# Patient Record
Sex: FEMALE | Race: WHITE | NOT HISPANIC OR LATINO | Employment: PART TIME | URBAN - METROPOLITAN AREA
[De-identification: names, ages, dates, MRNs, and addresses within clinical notes are randomized per-mention and may not be internally consistent; named-entity substitution may affect disease eponyms.]

---

## 2018-03-17 ENCOUNTER — HOSPITAL ENCOUNTER (EMERGENCY)
Facility: HOSPITAL | Age: 33
Discharge: HOME | End: 2018-03-17
Attending: FAMILY MEDICINE
Payer: MEDICAID

## 2018-03-17 VITALS
WEIGHT: 170 LBS | OXYGEN SATURATION: 100 % | RESPIRATION RATE: 18 BRPM | BODY MASS INDEX: 33.2 KG/M2 | HEART RATE: 73 BPM | SYSTOLIC BLOOD PRESSURE: 125 MMHG | DIASTOLIC BLOOD PRESSURE: 82 MMHG | TEMPERATURE: 100 F

## 2018-03-17 DIAGNOSIS — T14.90XA INJURY: ICD-10-CM

## 2018-03-17 DIAGNOSIS — T14.8XXA FRACTURE: Primary | ICD-10-CM

## 2018-03-17 PROCEDURE — 99283 EMERGENCY DEPT VISIT LOW MDM: CPT | Mod: 25

## 2018-03-17 PROCEDURE — 29125 APPL SHORT ARM SPLINT STATIC: CPT | Mod: LT

## 2018-03-17 PROCEDURE — 63600175 PHARM REV CODE 636 W HCPCS: Performed by: FAMILY MEDICINE

## 2018-03-17 PROCEDURE — 96372 THER/PROPH/DIAG INJ SC/IM: CPT | Mod: 59

## 2018-03-17 RX ORDER — KETOROLAC TROMETHAMINE 30 MG/ML
60 INJECTION, SOLUTION INTRAMUSCULAR; INTRAVENOUS
Status: COMPLETED | OUTPATIENT
Start: 2018-03-17 | End: 2018-03-17

## 2018-03-17 RX ORDER — HYDROCODONE BITARTRATE AND ACETAMINOPHEN 7.5; 325 MG/1; MG/1
1 TABLET ORAL EVERY 4 HOURS PRN
Qty: 18 TABLET | Refills: 0 | Status: SHIPPED | OUTPATIENT
Start: 2018-03-17 | End: 2018-11-21

## 2018-03-17 RX ORDER — NALOXONE HCL 0.4 MG/ML
VIAL (ML) INJECTION
Status: DISCONTINUED
Start: 2018-03-17 | End: 2018-03-17 | Stop reason: WASHOUT

## 2018-03-17 RX ADMIN — KETOROLAC TROMETHAMINE 60 MG: 30 INJECTION, SOLUTION INTRAMUSCULAR at 04:03

## 2018-03-17 NOTE — ED PROVIDER NOTES
Encounter Date: 3/17/2018       History     Chief Complaint   Patient presents with    Wrist Injury     left wrist pain after falling while roller skating     32-year-old female patient comes in with complaint of wrist injury while rollerskating patient drove herself here from the roller skating rink but otherwise has a makeshift splint on her arm patient has no other injuries did not hit her head GCS of 15.          Review of patient's allergies indicates:  No Known Allergies  History reviewed. No pertinent past medical history.  History reviewed. No pertinent surgical history.  History reviewed. No pertinent family history.  Social History   Substance Use Topics    Smoking status: Current Every Day Smoker     Packs/day: 0.50    Smokeless tobacco: Never Used    Alcohol use No     Review of Systems   Constitutional: Negative for fever.   HENT: Negative for sore throat.    Respiratory: Negative for shortness of breath.    Cardiovascular: Negative for chest pain.   Gastrointestinal: Negative for nausea.   Genitourinary: Negative for dysuria.   Musculoskeletal: Positive for joint swelling and myalgias. Negative for back pain.   Skin: Negative for rash.   Neurological: Negative for weakness.   Hematological: Does not bruise/bleed easily.   All other systems reviewed and are negative.      Physical Exam     Initial Vitals [03/17/18 1531]   BP Pulse Resp Temp SpO2   125/82 73 18 99.5 °F (37.5 °C) 100 %      MAP       96.33         Physical Exam    Nursing note and vitals reviewed.  Constitutional: She appears well-developed.   HENT:   Head: Normocephalic and atraumatic.   Right Ear: External ear normal.   Left Ear: External ear normal.   Nose: Nose normal.   Mouth/Throat: Oropharynx is clear and moist.   Eyes: Conjunctivae and EOM are normal. Pupils are equal, round, and reactive to light. Right eye exhibits no discharge. Left eye exhibits no discharge.   Neck: Normal range of motion. Neck supple. No tracheal  deviation present.   Cardiovascular: Normal rate, regular rhythm and normal heart sounds.   No murmur heard.  Pulmonary/Chest: Breath sounds normal. No respiratory distress. She has no wheezes.   Abdominal: Soft. Bowel sounds are normal.   Musculoskeletal: She exhibits tenderness.   Neurological: She is alert and oriented to person, place, and time.   Skin: Skin is warm and dry.         ED Course   Procedures  Labs Reviewed - No data to display       X-Rays:   Independently Interpreted Readings:   Other Readings:  Fracture of the left wrist    Medical Decision Making:   Initial Assessment:   Patient in moderate distress and no other complains    Differential Diagnosis:   Fracture  Edema  Sprain   strain                        Clinical Impression:   The primary encounter diagnosis was Fracture. A diagnosis of Injury was also pertinent to this visit.                           Kermit Quiñones MD  03/17/18 4950

## 2018-03-20 ENCOUNTER — OFFICE VISIT (OUTPATIENT)
Dept: FAMILY MEDICINE | Facility: CLINIC | Age: 33
End: 2018-03-20
Payer: MEDICAID

## 2018-03-20 VITALS
DIASTOLIC BLOOD PRESSURE: 69 MMHG | BODY MASS INDEX: 35.49 KG/M2 | HEART RATE: 100 BPM | SYSTOLIC BLOOD PRESSURE: 110 MMHG | WEIGHT: 180.75 LBS | TEMPERATURE: 98 F | OXYGEN SATURATION: 74 % | HEIGHT: 60 IN

## 2018-03-20 DIAGNOSIS — S52.132A DISPLACED FRACTURE OF NECK OF LEFT RADIUS, INITIAL ENCOUNTER FOR CLOSED FRACTURE: Primary | ICD-10-CM

## 2018-03-20 PROCEDURE — 99203 OFFICE O/P NEW LOW 30 MIN: CPT | Mod: S$PBB,,, | Performed by: FAMILY MEDICINE

## 2018-03-20 PROCEDURE — 99213 OFFICE O/P EST LOW 20 MIN: CPT | Mod: PBBFAC,PO | Performed by: FAMILY MEDICINE

## 2018-03-20 PROCEDURE — 99999 PR PBB SHADOW E&M-EST. PATIENT-LVL III: CPT | Mod: PBBFAC,,, | Performed by: FAMILY MEDICINE

## 2018-03-20 RX ORDER — OXYCODONE AND ACETAMINOPHEN 5; 325 MG/1; MG/1
1 TABLET ORAL EVERY 8 HOURS PRN
Qty: 20 TABLET | Refills: 0 | Status: SHIPPED | OUTPATIENT
Start: 2018-03-20 | End: 2018-11-21

## 2018-03-20 RX ORDER — PROMETHAZINE HYDROCHLORIDE 25 MG/1
25 TABLET ORAL EVERY 6 HOURS PRN
Qty: 30 TABLET | Refills: 0 | Status: SHIPPED | OUTPATIENT
Start: 2018-03-20 | End: 2018-11-21

## 2018-03-20 NOTE — PROGRESS NOTES
(Portions of this note were dictated using voice recognition software and may contain dictation related errors in spelling/grammar/syntax not found on text review)    CC:   Chief Complaint   Patient presents with    Wrist Injury       HPI: 32 y.o. female presents as a new patient today.  Was seen in ED 3 days ago where she was evaluated for a left wrist injury after falling while rollerskating. X-rays revealed a transverse distal radial metaphysis fracture with dorsal displacement and angulation.  She also had a nondisplaced ulnar styloid fracture.  She was splinted in the ED.  Does complain of pain at the left wrist.  Was given hydrocodone 7.5 but finds this is only slightly helps with her pain.  Has a lot more pain at night.  Has been also having some vomiting likely secondary to the pain medication but is overall trying to eat and drink fluids normally.  No other injury.                    History reviewed. No pertinent past medical history.    Past Surgical History:   Procedure Laterality Date     SECTION      CHOLECYSTECTOMY      EYE SURGERY      LITHOTRIPSY      TUBAL LIGATION         No family history on file.    Social History     Social History    Marital status:      Spouse name: N/A    Number of children: N/A    Years of education: N/A     Occupational History    Not on file.     Social History Main Topics    Smoking status: Current Every Day Smoker     Packs/day: 0.50    Smokeless tobacco: Never Used    Alcohol use No    Drug use: No    Sexual activity: Not on file     Other Topics Concern    Not on file     Social History Narrative    No narrative on file       ROS:  GENERAL: No fever, chills, fatigability or weight loss.  SKIN: No rashes, no itching.  HEAD: No headaches.  EYES: No visual changes  EARS: No ear pain or changes in hearing.  NOSE: No congestion or rhinorrhea.  MOUTH & THROAT: No hoarseness, change in voice, or sore throat.  NODES: Denies swollen  glands.  CHEST: Denies CONNER, cyanosis, wheezing, cough and sputum production.  CARDIOVASCULAR: Denies chest pain, PND, orthopnea.  ABDOMEN: No nausea, vomiting, or changes in bowel function.  URINARY: No flank pain, dysuria or hematuria.  PERIPHERAL VASCULAR: No claudication or cyanosis.  MUSCULOSKELETAL: Above.  NEUROLOGIC: Does get some burning sensation in her fingers and sometimes shooting pain up the arm on the left.    Vital signs reviewed  PE:   APPEARANCE: Well nourished, well developed, in no acute distress.    HEAD: Normocephalic, atraumatic.  EYES:    Conjunctivae noninjected.  CHEST: Good inspiratory effort. Lungs clear to auscultation with no wheezes or crackles.  CARDIOVASCULAR: Normal S1, S2. No rubs, murmurs, or gallops.  ABDOMEN: Bowel sounds normal. Not distended. Soft. No tenderness or masses. No organomegaly.  EXTREMITIES: Left arm in sling and splint, not manipulated on today's exam. Distal sensation intact.     IMPRESSION  1. Displaced fracture of neck of left radius, initial encounter for closed fracture            PLAN  Reviewed x-rays.  She does have a significantly dorsally displaced distal radial fracture with nondisplaced ulnar styloid fracture.  Given displacement and angulation of radial fracture, suspicious that she will need surgical management.  Referral to orthopedics for further evaluation and management.  Hydrocodone is not providing enough pain relief at this time.  Will switch to Percocet 5/325 as needed, Phenergan given in case of nausea symptoms

## 2018-03-22 DIAGNOSIS — S52.572A CLOSED DIE PUNCH FRACTURE OF DISTAL RADIUS, LEFT, INITIAL ENCOUNTER: Primary | ICD-10-CM

## 2018-03-22 DIAGNOSIS — S52.612A: ICD-10-CM

## 2018-04-03 ENCOUNTER — CLINICAL SUPPORT (OUTPATIENT)
Dept: REHABILITATION | Facility: HOSPITAL | Age: 33
End: 2018-04-03
Attending: SURGERY
Payer: MEDICAID

## 2018-04-03 DIAGNOSIS — M25.60 DECREASED RANGE OF MOTION: ICD-10-CM

## 2018-04-03 DIAGNOSIS — M25.532 LEFT WRIST PAIN: ICD-10-CM

## 2018-04-03 DIAGNOSIS — M25.432 SWELLING OF WRIST JOINT, LEFT: ICD-10-CM

## 2018-04-03 PROBLEM — M79.89 SWELLING OF LEFT HAND: Status: ACTIVE | Noted: 2018-04-03

## 2018-04-03 PROCEDURE — L3808 WHFO, RIGID W/O JOINTS: HCPCS | Mod: PO

## 2018-04-03 PROCEDURE — 97165 OT EVAL LOW COMPLEX 30 MIN: CPT | Mod: PO

## 2018-04-03 NOTE — PATIENT INSTRUCTIONS
[facility]  OCCUPATIONAL THERAPY  HOME EXERCISE PROGRAM     Complete the following exercises with 10 repetitions each, 4-6x/day.       Perform the above elbow exercises in 3 positions:  -Palm up, Palm down, and Thumb up      Perform the above exercise with your elbow bent at your side.    [facility]  OCCUPATIONAL THERAPY  HOME EXERCISE PROGRAM     Complete the following exercises with 10 repetitions each, 4-6x/day.                                                                                        Complete the following exercises with 10 repetitions each, 4-6x/day.                                                           Therapist: TRENTON Benz

## 2018-04-03 NOTE — PLAN OF CARE
Occupational Therapy -Hand / Wrist  Evaluation    Patient: Sarah Yee  Date of Evaluation: 4/3/2018  Referring Physician:  Dr. Cesar San  Diagnosis: s/p ORIF of L DR fx & ulna styloid fx     1. Left wrist pain     2. Swelling of left hand     3. Decreased range of motion       MRN: [id number]    Referral Orders:   Eval and treat; splint fabrication of wrist cock up    Start Time: 1:15  End Time: 2:10  Total Time: 55 min  IE x 30 min, Orthosis fabrication ( WHFO, static wrist cock up) x 25 min     Hand dominance: Right    Occupation:  Pt was working at [employer], but took leave just before her injury  Working presently:  no  Workmen's Compensation:  no    Date of onset: sx date 3/28/18; 6 days status post surgery  Involved area:  L wrist  Mechanism of Injury:   Fall at a skating rink  Past Medical History/Physical Systems Review:   No past medical history on file.  Past Surgical History:   Procedure Laterality Date     SECTION      CHOLECYSTECTOMY      EYE SURGERY      LITHOTRIPSY      TUBAL LIGATION           Living status: Lives with her 12 y/o son. Pt typically drives, but has not been since injury. PLOF independent.    Environmental Concerns/ Fall Risk:  None  Barriers to Learning: None   Cultural/Spiritual : None   Developmental/Education: None   Abuse/ Neglect: none   Nutritional Deficit: None   Language: None   Hearing/Vision Deficit: None   Other:     Subjective:  Pt reports this wrap and stuff they put me in has been really bothering me. I've been getting some numbness and tingling in my arm and hand.    Pain   At rest: 6 out of 10  With work/ Activity: 8 out of 10  Sleepin out of 10, occasionally  Location of Pain : L wrist    Patients goals for therapy are:  to get my wrist strong again    Objective:   Observation  : Pt wearing ortho volar cast/splint with dressings.  Swelling noted at wrist and distal forearm. Pt presents with staples in incision. Min bruising noted at  volar forearm    Sensation: light touch intact, c/o tingling in thumb, IF, & LF  Scar / Wound: healing, clean incision, with staples remaining in incision; min bruising noted   Edema:  Mild edema noted in hand, wrist and FA; minimal swelling noted in fingers  L  MP's: 18.8  PPC: 18.5  PWC: 17          Range of Motion:  ROM: Thumb   /   Index   /   Middle   /   Ring  /   Small   MP       0/25;         0/62;        -5/65 ;     -8/60;      0/60  PIP       0/15;         0/80;         0/75 ;      0/75;      0/70  DIP       -----;         0/45;         0/50 ;       0/40;      0/60  ASIF      40;            187;         185;         167;       190    Thumb opposition: to LF  Thumb ext: 40  Thumb abd: 45    Supination/Pronation: 20 / 35  Wrist AROM: deferred today                        Manual Muscle Test: deferred                                       Strength: (JOSS Dynamometer in lbs.), Average 3 trials, Position II: tba when appropriate        Pinch Strength: (Pinch Gauge in psis), Average 3 trials: tba when appropriate          Functional Limitations:   Self Care / ADL: Limited use of L hand for ADLs, grooming, hygiene  Work/Activities: Limited use of L hand for driving, cleaning, cooking, picking up items  Leisure: Limited use of L hand for leisure activities    Focus on Therapeutic Outcomes (FOTO) Symptom Scale: Patient score indicates self perception of 58% impairment, limitation or restriction of function upon initial assessment.       Treatment Included:   OT evaluation and instruction in written HEP including modalities prn for pain and inflammation management, wound care, tendon glides, finger abd/add, thumb opposition, thumb ext/abd, thumb flex, joint blocking to thumb.   Fabricated a custom static volar wrist cock up orthosis (L 3808 FO) to L volar hand wrist and forearm to maintain immobilization of L wrist.  Instructed to wear orthosis 24/7 with removal for hygiene/wound care.  Instructed to monitor  "for pain/pressure, increased edema, or redness and to contact office for adjustments as needed. Patient reported good understanding of orthotic wear and care schedule.      Patient demonstrates good understanding of HEP/modality use for pain management.      Assessment:   Pt is a 33 y/o female with dx of s/p ORIF of L DR fx and ulnar styloid fx. Pt presents with the below mentioned problems. Satisfactory fit noted of orthosis, but adjustments may be required in the future due to change in swelling.  Nearly full fist noted after performing HEP today. Pt would cont to benefit from skilled OT services to improve deficits per protocol, and maximize functional use of L UE.     Rehab potential:   Excellent    Problem List :   Decreased ROM   Decreased  and pinch strength   Decreased muscle strength   Decreased functional hand use   Increased pain   Edema         Profile and History Assessment of Occupational Performance Level of Clinical Decision Making Complexity Score   Occupational Profile:   Sarah Yee is a 32 y.o. female who lives with their son and is currently employed at [employer], but was on leave. Sarah Yee has difficulty with  bathing, grooming and dressing  driving/transportation management and housework/household chores  affecting his/her daily functional abilities. His/her main goal for therapy is " to get my wrist strong again".     Comorbidities:   none    Medical and Therapy History Review:   Brief, no prior therapy    LOW     Performance Deficits    Physical:  Joint Mobility  Joint Stability  Muscle Power/Strength  Skin Integrity/Scar Formation  Edema   Strength  Fine Motor Coordination  Pain    Cognitive:  No Deficits    Psychosocial:    Habits  Routines    MODERATE Clinical Decision Making:  low    Assessment Process:  Comprehensive Assessment  **fabriccation of orthosis today    Modification/Need for Assistance:  Not Necessary    Intervention Selection:  Multiple Treatment Options    LOW   " low  Based on PMHX, co morbidities , data from assessments and functional level of assistance required with task and clinical presentation directly impacting function.       STGs (4 weeks):  1)  Initiate HEP, modalities, and fabricate orthosis  2) Increase ROM of fingers, wrist, and FA 5-10 degrees to increase functional hand use for ADLs/work/leisure activities  3)   Decrease edema .2-.3 mm to increase joint mobility /flexibility for functional hand use.  4)  Pt will report pain no higher than 4/10 at the worst during ADLs/IADLs    Goals: ( 8 weeks)   1)  Patient to be IND with HEP and modalities for pain management  2)  Pt will demonstrate full L composite fist in order to  items needed for daily activities  3)  Pt will demonstrate L wrist and FA AROM WFL in order to perform ADLs/IADLs  4)   and pinch to be assessed when appropriate, and goals set accordingly  5)  Pt will achieve FOTO score no higher than 35% impairment with L UE function    Plan:   Patient to be treated by Occupational Therapy  2-3  times per week for   8                   weeks  during the certification period from   4/3/2018     to  6/3/18 to achieve the established goals.  Treatment to include :  paraffin, fluidotherapy, manual therapy/joint mobilizations, modalities for pain management, US 3mhz, therapeutic exercises/activities, strengthening, scar and edema management, orthosis fabrication, as well as any other treatments deemed necessary based on the patient's needs or progress.  Progress per protocol and MD orders.          Therapist: TRENTON Benz  Date: 4/3/18      I certify the need for these services furnished under this plan of treatment and while under my care    ____________________________________                         __________________  Physician/Referring Practitioner                                               Date of Signature

## 2018-04-05 ENCOUNTER — CLINICAL SUPPORT (OUTPATIENT)
Dept: REHABILITATION | Facility: HOSPITAL | Age: 33
End: 2018-04-05
Attending: SURGERY
Payer: MEDICAID

## 2018-04-05 DIAGNOSIS — M79.89 SWELLING OF LEFT HAND: ICD-10-CM

## 2018-04-05 DIAGNOSIS — M25.432 SWELLING OF WRIST JOINT, LEFT: ICD-10-CM

## 2018-04-05 DIAGNOSIS — M25.532 LEFT WRIST PAIN: ICD-10-CM

## 2018-04-05 PROCEDURE — 97530 THERAPEUTIC ACTIVITIES: CPT | Mod: PO

## 2018-04-05 NOTE — PROGRESS NOTES
"DAILY TREATMENT NOTE    DATE: 4/5/2018    Start Time:  2:00  Stop Time:  2:35  Visit: 2/25, expires 6/8/18    PROCEDURES:    TIMED  Procedure Min.   TA 35                     UNTIMED  Procedure Min.             Total Timed Minutes:  35  Total Timed Units:  2  Total Untimed Units:  0  Charges Billed/# of units:  2TA      Progress/Current Status    Subjective:     Patient ID: Sarah Yee is a 32 y.o. female.  Diagnosis:   1. Left wrist pain     2. Swelling of left hand     3. Swelling of wrist joint, left       Pain: 6-7 /10  Pt states "I went and got my staples out this morning. It feels much better. And the doctor said to hold off on turning my arm up and down (re: sup/pro), and wants me to start moving my wrist back." Pt reports compliance with HEP and orthosis wear. Pt requested small adjustment be made to orthosis for improved fit near fingers.    Objective:     **Pt is 1 week and 1 day post op; staples removed today at MD follow up, steristrips present  Clean healing incision noted. Pt wearing orthosis to clinic.     Pt seen by OT this session. Treatment consisted of the following:     Date: 4/5/18    Visit: 2/25; expires 6/8/18   PROM to all digits as tolerated 10 reps each   Wrist gentle AROM (ext/flex/RD/UD), as tolerated 10 reps each   Tendon glides (intrinsic +/-, flat fist, composite fist) 10 reps   Thumb opposition 10 reps, able to oppose to SF   Thumb circumduction 10 reps   Thumb flex/ext 10 reps   Finger abd/add 10 reps   Digit ext off table 10 reps each         Adjustments made to orthosis for better fit and movement of MPs.    Assessment:     Pt tolerated treatment well this date. Pt reported moderate pain coming in, but denied increase in pain. Good tolerance of activity. Staples removed earlier today, and steristrips present. Good fit noted of orthosis after adjustments made. Full loose composite fist noted today, and improved opposition noted as well. Pt cont to present with pain, stiffness, " decreased ROM, weakness, and limited functionally with L hand. Some limitations due to protocol, cont to be NWB. Good compliance with HEP and orthosis wear. Pt would cont to benefit from skilled OT services to maximize functional use of L hand.     Patient Education/Response:     Cont HEP, modalities, wound care, hand hygiene, and orthosis wear. Pt verbalized understanding of education and review of HEP.     Plans and Goals:     Cont OT poc 2-3x/week for 8 weeks during certification period 4/3/18 to 6/3/18 in pursuit of established goals.    STGs (4 weeks):  1)  Initiate HEP, modalities, and fabricate orthosis  2) Increase ROM of fingers, wrist, and FA 5-10 degrees to increase functional hand use for ADLs/work/leisure activities  3)   Decrease edema .2-.3 mm to increase joint mobility /flexibility for functional hand use.  4)  Pt will report pain no higher than 4/10 at the worst during ADLs/IADLs     Goals: ( 8 weeks)   1)  Patient to be IND with HEP and modalities for pain management  2)  Pt will demonstrate full L composite fist in order to  items needed for daily activities  3)  Pt will demonstrate L wrist and FA AROM WFL in order to perform ADLs/IADLs  4)   and pinch to be assessed when appropriate, and goals set accordingly  5)  Pt will achieve FOTO score no higher than 35% impairment with L UE function    TRENTON Benz

## 2018-04-06 DIAGNOSIS — S52.501A CLOSED FRACTURE OF RIGHT DISTAL RADIUS: Primary | ICD-10-CM

## 2018-04-10 ENCOUNTER — CLINICAL SUPPORT (OUTPATIENT)
Dept: REHABILITATION | Facility: HOSPITAL | Age: 33
End: 2018-04-10
Attending: SURGERY
Payer: MEDICAID

## 2018-04-10 DIAGNOSIS — M25.532 LEFT WRIST PAIN: ICD-10-CM

## 2018-04-10 DIAGNOSIS — M25.432 SWELLING OF WRIST JOINT, LEFT: ICD-10-CM

## 2018-04-10 DIAGNOSIS — M79.89 SWELLING OF LEFT HAND: ICD-10-CM

## 2018-04-10 PROCEDURE — 97530 THERAPEUTIC ACTIVITIES: CPT | Mod: PO

## 2018-04-10 NOTE — PROGRESS NOTES
"DAILY TREATMENT NOTE    DATE: 4/10/2018    Start Time:  3:00  Stop Time:  3:50  Visit: 3/25, expires 6/8/18    PROCEDURES:    TIMED  Procedure Min.   TA 40                     UNTIMED  Procedure Min.   Paraffin 10         Total Timed Minutes:  40  Total Timed Units:  3  Total Untimed Units:  0  Charges Billed/# of units:  3TA      Progress/Current Status    Subjective:     Patient ID: Sarah Yee is a 33 y.o. female.  Diagnosis:   1. Left wrist pain     2. Swelling of left hand     3. Swelling of wrist joint, left       Pain: 6 /10  Pt states "I'm constantly moving my fingers. I can tell I'm getting better." Pt reports compliance with HEP and orthosis wear.     Objective:     **Pt is 1 week and 6 day post op; closed incision with 4 steristrips still present. Steristrips easily removed. Min scabbing still noted.    Pt wearing orthosis to clinic. Small macerated area noted at hypothenar. Reviewed keeping hand dry after washing, hand hygiene, and checking and monitoring hand and wrist daily. Pt verbalized understanding of education.     Pt seen by OT this session. Treatment consisted of the following:    Patient received paraffin bath to L hand(s) for 10 minutes to increase blood flow, circulation, pain management and for tissue elasticity prior to therex.   Performed gentle scar massage to volar wrist/forearm incision area for 5 minutes to decrease adhesions and improve tensile glide.        Date: 4/10/18    Visit: 3/25; expires 6/8/18   PROM to all digits as tolerated 10 reps each   Wrist gentle AROM (ext/flex/RD/UD), as tolerated 10 reps each   Tendon glides (intrinsic +/-, flat fist, composite fist) 10 reps   Thumb opposition 10 reps, able to oppose to SF   Thumb circumduction 10 reps CW/CCW   Thumb flex/ext 10 reps   Finger abd/add 10 reps   Digit ext off table 10 reps each   Wrist ext off of table against gravity 10 reps     Pt declined cold pack after tx.     Assessment:     Pt tolerated treatment well this " date. Pt reported moderate pain coming in again, but denied increase in pain. Good tolerance of activity.  Full composite fist noted today, and improved opposition noted as well. Pt reported decreased tightness in wrist and digits. Increased wrist AROM noted this date. Pt cont to present with pain, stiffness, decreased ROM, weakness, and limited functionally with L hand. Some limitations due to protocol, cont to be NWB. Good compliance with HEP and orthosis wear. Pt would cont to benefit from skilled OT services to maximize functional use of L hand.     Patient Education/Response:     Cont HEP, modalities, scar management,  hand hygiene, and orthosis wear. Pt verbalized understanding of education and review of HEP.     Plans and Goals:     Cont OT poc 2-3x/week for 8 weeks during certification period 4/3/18 to 6/3/18 in pursuit of established goals.    STGs (4 weeks):  1)  Initiate HEP, modalities, and fabricate orthosis  2) Increase ROM of fingers, wrist, and FA 5-10 degrees to increase functional hand use for ADLs/work/leisure activities  3)   Decrease edema .2-.3 mm to increase joint mobility /flexibility for functional hand use.  4)  Pt will report pain no higher than 4/10 at the worst during ADLs/IADLs     Goals: ( 8 weeks)   1)  Patient to be IND with HEP and modalities for pain management  2)  Pt will demonstrate full L composite fist in order to  items needed for daily activities  3)  Pt will demonstrate L wrist and FA AROM WFL in order to perform ADLs/IADLs  4)   and pinch to be assessed when appropriate, and goals set accordingly  5)  Pt will achieve FOTO score no higher than 35% impairment with L UE function    TRENTON Benz

## 2018-04-12 ENCOUNTER — CLINICAL SUPPORT (OUTPATIENT)
Dept: REHABILITATION | Facility: HOSPITAL | Age: 33
End: 2018-04-12
Attending: SURGERY
Payer: MEDICAID

## 2018-04-12 DIAGNOSIS — M25.532 LEFT WRIST PAIN: ICD-10-CM

## 2018-04-12 DIAGNOSIS — M79.89 SWELLING OF LEFT HAND: ICD-10-CM

## 2018-04-12 DIAGNOSIS — M25.432 SWELLING OF WRIST JOINT, LEFT: ICD-10-CM

## 2018-04-12 PROCEDURE — 97530 THERAPEUTIC ACTIVITIES: CPT | Mod: PO

## 2018-04-12 NOTE — PROGRESS NOTES
"DAILY TREATMENT NOTE    DATE: 4/12/2018    Start Time:  1:00  Stop Time:  1:45  Visit: 4/25, expires 6/8/18    PROCEDURES:    TIMED  Procedure Min.   TA 40                     UNTIMED  Procedure Min.   Paraffin 5         Total Timed Minutes:  40  Total Timed Units:  3  Total Untimed Units:  0  Charges Billed/# of units:  3TA      Progress/Current Status    Subjective:     Patient ID: Sarah Yee is a 33 y.o. female.  Diagnosis:   1. Left wrist pain     2. Swelling of left hand     3. Swelling of wrist joint, left       Pain: 3-4 /10  Pt states "I'm having a little pain today, not too bad. It might be a little more sore because I was using it to help me shower." Pt reports compliance with HEP and orthosis wear.     Objective:     **Pt is 2 week and 0 day post op;  Min scabbing still noted. Closed incision.   Pt wearing orthosis to clinic.     Pt seen by OT this session. Treatment consisted of the following:    Patient received paraffin bath to L hand(s) for 5 minutes to increase blood flow, circulation, pain management and for tissue elasticity prior to therex.   Performed gentle scar massage to volar wrist/forearm incision area for 5 minutes to decrease adhesions and improve tensile glide.        Date: 4/12/18    Visit: 4/25; expires 6/8/18   PROM to all digits as tolerated 10 reps each   Wrist gentle AROM (ext/flex/RD/UD), as tolerated 10 reps each   Tendon glides (intrinsic +/-, flat fist, composite fist) 10 reps   Pinky slides 10 reps   Thumb circumduction 10 reps CW/CCW   Thumb flex/ext 10 reps   Finger abd/add 10 reps   Digit ext off table 10 reps each   Wrist ext off of table against gravity 10 reps   Gentle supination/pronation AROM, pain free 10 reps     Pt declined cold pack after tx.     Assessment:     Pt tolerated treatment well this date. Pt reported moderate pain coming in again, but denied increase in pain during treatment. Good tolerance of activity.  Full composite fist noted today, and improved " opposition noted as well. Pt reported decreased tightness in wrist and digits. Increased wrist AROM noted again this date. Pt cont to present with pain, stiffness, decreased ROM, weakness, and limited functionally with L hand. Some limitations due to protocol, cont to be NWB. Good compliance with HEP and orthosis wear. Pt would cont to benefit from skilled OT services to maximize functional use of L hand.     Patient Education/Response:     Cont HEP, modalities, scar management,  hand hygiene, and orthosis wear. Pt verbalized understanding of education and review of HEP.     Plans and Goals:     Cont OT poc 2-3x/week for 8 weeks during certification period 4/3/18 to 6/3/18 in pursuit of established goals.    STGs (4 weeks):  1)  Initiate HEP, modalities, and fabricate orthosis  2) Increase ROM of fingers, wrist, and FA 5-10 degrees to increase functional hand use for ADLs/work/leisure activities  3)   Decrease edema .2-.3 mm to increase joint mobility /flexibility for functional hand use.  4)  Pt will report pain no higher than 4/10 at the worst during ADLs/IADLs     Goals: ( 8 weeks)   1)  Patient to be IND with HEP and modalities for pain management  2)  Pt will demonstrate full L composite fist in order to  items needed for daily activities  3)  Pt will demonstrate L wrist and FA AROM WFL in order to perform ADLs/IADLs  4)   and pinch to be assessed when appropriate, and goals set accordingly  5)  Pt will achieve FOTO score no higher than 35% impairment with L UE function    TRENTON Benz

## 2018-04-16 ENCOUNTER — CLINICAL SUPPORT (OUTPATIENT)
Dept: REHABILITATION | Facility: HOSPITAL | Age: 33
End: 2018-04-16
Attending: SURGERY
Payer: MEDICAID

## 2018-04-16 DIAGNOSIS — M79.89 SWELLING OF LEFT HAND: ICD-10-CM

## 2018-04-16 DIAGNOSIS — M25.532 LEFT WRIST PAIN: ICD-10-CM

## 2018-04-16 DIAGNOSIS — M25.432 SWELLING OF WRIST JOINT, LEFT: ICD-10-CM

## 2018-04-16 PROCEDURE — 97530 THERAPEUTIC ACTIVITIES: CPT | Mod: PO

## 2018-04-16 NOTE — PROGRESS NOTES
"DAILY TREATMENT NOTE    DATE: 4/16/2018    Start Time:  10:50  Stop Time:  11:25  Visit: 5/25, expires 6/8/18    PROCEDURES:    TIMED  Procedure Min.   TA 30                     UNTIMED  Procedure Min.   Paraffin 5         Total Timed Minutes:  30  Total Timed Units:  2  Total Untimed Units:  0  Charges Billed/# of units:  2TA      Progress/Current Status    Subjective:     Patient ID: Sarah Yee is a 33 y.o. female.  Diagnosis:   1. Left wrist pain     2. Swelling of left hand     3. Swelling of wrist joint, left       Pain: 3-4 /10  Pt states "The pain isn't too bad the only pain I'm really getting is in my palm near the thumb. That pain still hasn't gone away. I am starting to get more feeling in some of my fingers now." Pt reports compliance with HEP and orthosis wear.     Objective:     **Pt is 2 week and 5 days post op;  Min scabbing still noted. Closed incision.   Pt wearing orthosis to clinic.     Pt seen by OT this session. Treatment consisted of the following:    Patient received paraffin bath to L hand(s) for 5 minutes to increase blood flow, circulation, pain management and for tissue elasticity prior to therex.   Performed gentle scar massage to volar wrist/forearm incision area for 5 minutes to decrease adhesions and improve tensile glide.        Date: 4/16/18    Visit: 5/25; expires 6/8/18   PROM to all digits as tolerated 10 reps each   Wrist  AA/AROM (ext/flex/RD/UD), as tolerated 10 reps each   Tendon glides (intrinsic +/-, flat fist, composite fist) 10 reps   Pinky slides 10 reps   Thumb circumduction 10 reps CW/CCW   Thumb flex/ext 10 reps   Finger abd/add 10 reps   Digit ext off table 10 reps each   Wrist ext off of table against gravity 10 reps   Gentle supination/pronation AROM, pain free 10 reps   Place and hold for wrist and finger ext 10 reps, with 5 sec hold each     Pt declined cold pack after tx.     Assessment:     Pt tolerated treatment well this date. Pt reported moderate pain coming " in again, but denied increase in pain during treatment. Good tolerance of activity.  Pt reported decreased tightness in wrist and digits. Increased wrist AROM noted again this date. Pt cont to present with pain, stiffness, decreased ROM, weakness, and limited functionally with L hand. Some limitations due to protocol, cont to be NWB. Good compliance with HEP and orthosis wear. Pt would cont to benefit from skilled OT services to maximize functional use of L hand.     Patient Education/Response:     Cont HEP, modalities, scar management,  hand hygiene, and orthosis wear. Pt verbalized understanding of education and review of HEP.     Plans and Goals:     Cont OT poc 2-3x/week for 8 weeks during certification period 4/3/18 to 6/3/18 in pursuit of established goals.    STGs (4 weeks):  1)  Initiate HEP, modalities, and fabricate orthosis  2) Increase ROM of fingers, wrist, and FA 5-10 degrees to increase functional hand use for ADLs/work/leisure activities  3)   Decrease edema .2-.3 mm to increase joint mobility /flexibility for functional hand use.  4)  Pt will report pain no higher than 4/10 at the worst during ADLs/IADLs     Goals: ( 8 weeks)   1)  Patient to be IND with HEP and modalities for pain management  2)  Pt will demonstrate full L composite fist in order to  items needed for daily activities  3)  Pt will demonstrate L wrist and FA AROM WFL in order to perform ADLs/IADLs  4)   and pinch to be assessed when appropriate, and goals set accordingly  5)  Pt will achieve FOTO score no higher than 35% impairment with L UE function    TRENTON Benz

## 2018-04-18 ENCOUNTER — CLINICAL SUPPORT (OUTPATIENT)
Dept: REHABILITATION | Facility: HOSPITAL | Age: 33
End: 2018-04-18
Attending: SURGERY
Payer: MEDICAID

## 2018-04-18 DIAGNOSIS — M25.532 LEFT WRIST PAIN: ICD-10-CM

## 2018-04-18 DIAGNOSIS — M79.89 SWELLING OF LEFT HAND: ICD-10-CM

## 2018-04-18 DIAGNOSIS — M25.432 SWELLING OF WRIST JOINT, LEFT: ICD-10-CM

## 2018-04-18 PROCEDURE — 97530 THERAPEUTIC ACTIVITIES: CPT | Mod: PO

## 2018-04-18 NOTE — PROGRESS NOTES
"DAILY TREATMENT NOTE    DATE: 4/18/2018    Start Time:  11:00  Stop Time:  11:40  Visit:6/25, expires 6/8/18    PROCEDURES:    TIMED  Procedure Min.   TA 35                     UNTIMED  Procedure Min.   Paraffin 5         Total Timed Minutes:  35  Total Timed Units:  2  Total Untimed Units:  0  Charges Billed/# of units:  2TA      Progress/Current Status    Subjective:     Patient ID: Sarah Yee is a 33 y.o. female.  Diagnosis:   1. Left wrist pain     2. Swelling of left hand     3. Swelling of wrist joint, left       Pain: 0 /10  Pt states "It's feeling better." Pt reports compliance with HEP and orthosis wear.     Objective:     **Pt is 3 week and 0 days post op;  Min scabbing still noted. Closed incision.   Pt wearing orthosis to clinic.     Pt seen by OT this session. Treatment consisted of the following:    Patient received paraffin bath to L hand(s) for 5 minutes to increase blood flow, circulation, pain management and for tissue elasticity prior to therex.   Performed gentle scar massage to volar wrist/forearm incision area for 5 minutes to decrease adhesions and improve tensile glide.        Date: 4/18/18    Visit: 6/25; expires 6/8/18   PROM to all digits as tolerated 10 reps each   Wrist  AA/AROM (ext/flex/RD/UD), as tolerated 10 reps each   Tendon glides (intrinsic +/-, flat fist, composite fist) 10 reps   Pinky slides 10 reps   Thumb circumduction 10 reps CW/CCW   Thumb flex/ext 10 reps   Finger abd/add 10 reps   Digit ext off table 10 reps each   Wrist ext off of table against gravity 10 reps   Gentle supination/pronation AROM, pain free 10 reps   Place and hold for wrist and finger ext 10 reps, with 5 sec hold each     Pt declined cold pack after tx.     Assessment:     Pt tolerated treatment well this date. Pt reported minimal pain coming in to therapy. Good tolerance of activity.  C/o wrist stiffness.   Pt cont to present with pain, stiffness, decreased ROM, weakness, and limited functionally with " L hand. Some limitations due to protocol, cont to be NWB. Good compliance with HEP and orthosis wear. Pt would cont to benefit from skilled OT services to maximize functional use of L hand.     Patient Education/Response:     Cont HEP, modalities, scar management,  hand hygiene, and orthosis wear. Pt verbalized understanding of education and review of HEP.     Plans and Goals:     Cont OT poc 2-3x/week for 8 weeks during certification period 4/3/18 to 6/3/18 in pursuit of established goals. Take measurements next visit.    STGs (4 weeks):  1)  Initiate HEP, modalities, and fabricate orthosis  2) Increase ROM of fingers, wrist, and FA 5-10 degrees to increase functional hand use for ADLs/work/leisure activities  3)   Decrease edema .2-.3 mm to increase joint mobility /flexibility for functional hand use.  4)  Pt will report pain no higher than 4/10 at the worst during ADLs/IADLs     Goals: ( 8 weeks)   1)  Patient to be IND with HEP and modalities for pain management  2)  Pt will demonstrate full L composite fist in order to  items needed for daily activities  3)  Pt will demonstrate L wrist and FA AROM WFL in order to perform ADLs/IADLs  4)   and pinch to be assessed when appropriate, and goals set accordingly  5)  Pt will achieve FOTO score no higher than 35% impairment with L UE function    TRENTON Benz

## 2018-04-24 ENCOUNTER — CLINICAL SUPPORT (OUTPATIENT)
Dept: REHABILITATION | Facility: HOSPITAL | Age: 33
End: 2018-04-24
Attending: SURGERY
Payer: MEDICAID

## 2018-04-24 DIAGNOSIS — M25.532 LEFT WRIST PAIN: ICD-10-CM

## 2018-04-24 DIAGNOSIS — M79.89 SWELLING OF LEFT HAND: ICD-10-CM

## 2018-04-24 DIAGNOSIS — M25.432 SWELLING OF WRIST JOINT, LEFT: ICD-10-CM

## 2018-04-24 PROCEDURE — 97530 THERAPEUTIC ACTIVITIES: CPT | Mod: PO

## 2018-04-24 NOTE — PROGRESS NOTES
"DAILY TREATMENT NOTE    DATE: 4/24/2018    Start Time:  1:00  Stop Time:  1:40  Visit:7/25, expires 6/8/18    PROCEDURES:    TIMED  Procedure Min.   TA 35                     UNTIMED  Procedure Min.   Paraffin 5         Total Timed Minutes:  35  Total Timed Units:  2  Total Untimed Units:  0  Charges Billed/# of units:  2TA      Progress/Current Status    Subjective:     Patient ID: Sarah Yee is a 33 y.o. female.  Diagnosis:   1. Left wrist pain     2. Swelling of left hand     3. Swelling of wrist joint, left       Pain: 0 /10  Pt states "It's just a little stiff." Pt reports compliance with HEP and orthosis wear.  Next MD follow up on 4/26/18.    Objective:     **Pt is 3 week and 6 days post op; Closed healing scar. Mild dermatitis noted on volar forearm   Pt wearing orthosis to clinic.     Pt seen by OT this session. Treatment consisted of the following:    Patient received paraffin bath to L hand(s) for 5 minutes to increase blood flow, circulation, pain management and for tissue elasticity prior to therex.   Performed gentle scar massage to volar wrist/forearm incision area for 5 minutes to decrease adhesions and improve tensile glide.        Date: 4/24/18    Visit: 7/25; expires 6/8/18   PROM to all digits as tolerated 10 reps each   Wrist  AA/AROM (ext/flex/RD/UD), as tolerated 10 reps each   Tendon glides (intrinsic +/-, flat fist, composite fist) 10 reps   Pinky slides 10 reps   Thumb circumduction 10 reps CW/CCW   Thumb flex/ext 10 reps   Finger abd/add 10 reps   Digit ext off table 10 reps each   Wrist ext off of table against gravity 10 reps   Gentle supination/pronation AROM, pain free 10 reps   Place and hold for wrist and finger ext 10 reps, with 5 sec hold each        Measurements taken this date 4/24/18:  Edema:  Mild edema noted in hand, wrist and FA; minimal swelling noted in fingers  L  MP's: 18.3 (-0.5)  PPC: 18.6 (+0.1)  PWC: 16.7 (-0.3)         Range of Motion:  Composite fist WFL  Digits " WFL     Thumb opposition: WFL but tight  Thumb ext: WFL  Thumb abd: WNL  Thumb MP: 33  Thumb IP: 43     Supination/Pronation: 24 (+4) / 55 (+20)  Wrist flex/ext: 42 / 50  RD/UD: 6 / 30    Assessment:     Pt tolerated treatment well this date. Pt denied pain. Cont c/o stiffness, but ROM improving.  Good tolerance of activity. Pt cont to present with pain, stiffness, decreased ROM, weakness, and limited functionally with L hand. Some limitations due to protocol, cont to be NWB. Good compliance with HEP, therapy attendance, and orthosis wear. PT making good progress towards goals. Pt would cont to benefit from skilled OT services to maximize functional use of L hand.     Patient Education/Response:     Cont HEP, modalities, scar management,  hand hygiene, and orthosis wear. Pt verbalized understanding of education and review of HEP.     Plans and Goals:     Cont OT poc 2-3x/week for 8 weeks during certification period 4/3/18 to 6/3/18 in pursuit of established goals. Progress as tolerated and per MD orders.     STGs (4 weeks):  1)  Initiate HEP, modalities, and fabricate orthosis  2) Increase ROM of fingers, wrist, and FA 5-10 degrees to increase functional hand use for ADLs/work/leisure activities  3)   Decrease edema .2-.3 mm to increase joint mobility /flexibility for functional hand use.  4)  Pt will report pain no higher than 4/10 at the worst during ADLs/IADLs     Goals: ( 8 weeks)   1)  Patient to be IND with HEP and modalities for pain management  2)  Pt will demonstrate full L composite fist in order to  items needed for daily activities  3)  Pt will demonstrate L wrist and FA AROM WFL in order to perform ADLs/IADLs  4)   and pinch to be assessed when appropriate, and goals set accordingly  5)  Pt will achieve FOTO score no higher than 35% impairment with L UE function    TRENTON Benz

## 2018-04-26 ENCOUNTER — CLINICAL SUPPORT (OUTPATIENT)
Dept: REHABILITATION | Facility: HOSPITAL | Age: 33
End: 2018-04-26
Attending: SURGERY
Payer: MEDICAID

## 2018-04-26 DIAGNOSIS — M79.89 SWELLING OF LEFT HAND: ICD-10-CM

## 2018-04-26 DIAGNOSIS — M25.432 SWELLING OF WRIST JOINT, LEFT: ICD-10-CM

## 2018-04-26 DIAGNOSIS — M25.532 LEFT WRIST PAIN: ICD-10-CM

## 2018-04-26 PROCEDURE — 97530 THERAPEUTIC ACTIVITIES: CPT | Mod: PO

## 2018-04-26 NOTE — PROGRESS NOTES
"OT DAILY TREATMENT NOTE    DATE: 4/26/2018    Start Time:  9:00  Stop Time:  9:45  Visit:8/25, expires 6/8/18    PROCEDURES:    TIMED  Procedure Min.   TA 40                     UNTIMED  Procedure Min.   Paraffin 5         Total Timed Minutes:  40  Total Timed Units:  2  Total Untimed Units:  0  Charges Billed/# of units:  3TA      Progress/Current Status    Subjective:     Patient ID: Sarah Yee is a 33 y.o. female.  Diagnosis:   1. Left wrist pain     2. Swelling of left hand     3. Swelling of wrist joint, left       Pain: 0 /10  Pt states "It's feeling fine. I play video games sometimes and that helps loosen my hand up." Pt reports compliance with HEP and orthosis wear.  Next MD follow up today 4/26/18.    Objective:     **Pt is 4 week and 1 days post op; Closed scar. Mild dermatitis noted on volar forearm   Pt wearing orthosis to clinic.     Pt seen by OT this session. Treatment consisted of the following:    Patient received paraffin bath to L hand(s) for 5 minutes to increase blood flow, circulation, pain management and for tissue elasticity prior to therex.   Performed gentle scar massage to volar wrist/forearm incision area for 5 minutes to decrease adhesions and improve tensile glide.        Date: 4/26/18    Visit: 8/25; expires 6/8/18   PROM to all digits as tolerated 10 reps each   Wrist  AA/AROM (ext/flex/RD/UD), as tolerated 10 reps each   Tendon glides (intrinsic +/-, flat fist, composite fist) 10 reps   Pinky slides 10 reps   Thumb circumduction 10 reps CW/CCW   Thumb flex/ext 10 reps   Finger abd/add 10 reps   Digit ext off table 10 reps each   Wrist ext while holding unweighted roll, to isolate wrist extensors 10 reps   Gentle supination/pronation AROM, pain free 10 reps   Place and hold for wrist and finger ext 10 reps, with 5 sec hold each   Wrist dexerciser 4 reps        Measurements taken 4/24/18:  Edema:  Mild edema noted in hand, wrist and FA; minimal swelling noted in fingers  L  MP's: 18.3 " (-0.5)  PPC: 18.6 (+0.1)  PWC: 16.7 (-0.3)         Range of Motion:  Composite fist WFL  Digits WFL     Thumb opposition: WFL but tight  Thumb ext: WFL  Thumb abd: WNL  Thumb MP: 33  Thumb IP: 43     Supination/Pronation: 24 (+4) / 55 (+20)  Wrist flex/ext: 42 / 50  RD/UD: 6 / 30    Assessment:     Pt tolerated treatment well this date. Pt denied pain. Cont c/o stiffness, but ROM improving.  Good tolerance of activity. Pt cont to present with occasional pain, stiffness, decreased ROM, weakness, and limited functionally with L hand. Some limitations due to protocol, cont to be NWB. Good compliance with HEP, therapy attendance, and orthosis wear. Pt making good progress towards goals. Pt would cont to benefit from skilled OT services to maximize functional use of L hand.     Patient Education/Response:     Cont HEP, modalities, scar management,  hand hygiene, and orthosis wear. Pt verbalized understanding of education and review of HEP.     Plans and Goals:     Cont OT poc 2-3x/week for 8 weeks during certification period 4/3/18 to 6/3/18 in pursuit of established goals. Progress as tolerated and per MD orders.     STGs (4 weeks):  1)  Initiate HEP, modalities, and fabricate orthosis  2) Increase ROM of fingers, wrist, and FA 5-10 degrees to increase functional hand use for ADLs/work/leisure activities  3)   Decrease edema .2-.3 mm to increase joint mobility /flexibility for functional hand use.  4)  Pt will report pain no higher than 4/10 at the worst during ADLs/IADLs     Goals: ( 8 weeks)   1)  Patient to be IND with HEP and modalities for pain management  2)  Pt will demonstrate full L composite fist in order to  items needed for daily activities  3)  Pt will demonstrate L wrist and FA AROM WFL in order to perform ADLs/IADLs  4)   and pinch to be assessed when appropriate, and goals set accordingly  5)  Pt will achieve FOTO score no higher than 35% impairment with L UE function    Lorna Johnson  [facility]

## 2018-04-30 DIAGNOSIS — S52.501A CLOSED FRACTURE OF RIGHT DISTAL RADIUS: Primary | ICD-10-CM

## 2018-05-01 ENCOUNTER — CLINICAL SUPPORT (OUTPATIENT)
Dept: REHABILITATION | Facility: HOSPITAL | Age: 33
End: 2018-05-01
Attending: SURGERY
Payer: MEDICAID

## 2018-05-01 DIAGNOSIS — M25.432 SWELLING OF WRIST JOINT, LEFT: ICD-10-CM

## 2018-05-01 DIAGNOSIS — M25.532 LEFT WRIST PAIN: ICD-10-CM

## 2018-05-01 DIAGNOSIS — M79.89 SWELLING OF LEFT HAND: ICD-10-CM

## 2018-05-01 PROCEDURE — 97530 THERAPEUTIC ACTIVITIES: CPT | Mod: PO

## 2018-05-01 NOTE — PROGRESS NOTES
"OT DAILY TREATMENT NOTE    DATE: 5/1/2018    Start Time:  2:00  Stop Time:  2:50  Visit:9/25, expires 6/8/18    PROCEDURES:    TIMED  Procedure Min.   TA 45                     UNTIMED  Procedure Min.   Paraffin 5         Total Timed Minutes:  45  Total Timed Units:  3  Total Untimed Units:  0  Charges Billed/# of units:  3TA      Progress/Current Status    Subjective:     Patient ID: Sarah Yee is a 33 y.o. female.  Diagnosis:   1. Left wrist pain     2. Swelling of left hand     3. Swelling of wrist joint, left       Pain: 0 /10  Pt states "I've been trying to move it a little more. It just still gets stiff." Pt reports compliance with HEP and orthosis wear.  Pt had ortho follow up on 4/26/18. Per MD orders, ok to increase intensity in therapy.    Objective:     **Pt is 4 week and 6 days post op; Closed scar. Mild dermatitis noted on volar forearm   Pt wearing orthosis to clinic.     Pt seen by OT this session. Treatment consisted of the following:    Patient received paraffin bath to L hand(s) for 5 minutes to increase blood flow, circulation, pain management and for tissue elasticity prior to therex.   Performed scar massage to volar wrist/forearm incision area for 5 minutes to decrease adhesions and improve tensile glide. Pt received passive stretch to wrist and forearm as tolerated.        Date: 5/1/18    Visit: 9/25; expires 6/8/18   PROM to all digits as tolerated 10 reps each   Wrist  AA/AROM (ext/flex/RD/UD), as tolerated 10 reps each   Tendon glides (intrinsic +/-, flat fist, composite fist) 10 reps   Pinky slides 10 reps   Thumb circumduction 10 reps CW/CCW   Thumb flex/ext 10 reps   Finger abd/add 10 reps   Digit ext off table 10 reps each   Wrist ext while holding unweighted roll, to isolate wrist extensors 10 reps   supination/pronation AROM, pain free 10 reps   Place and hold for wrist and finger ext 10 reps, with 5 sec hold each   Supination/pronation with dowel 10 reps   Wrist dexerciser 4 reps "        Measurements taken 4/24/18:  Edema:  Mild edema noted in hand, wrist and FA; minimal swelling noted in fingers  L  MP's: 18.3 (-0.5)  PPC: 18.6 (+0.1)  PWC: 16.7 (-0.3)         Range of Motion:  Composite fist WFL  Digits WFL     Thumb opposition: WFL but tight  Thumb ext: WFL  Thumb abd: WNL  Thumb MP: 33  Thumb IP: 43     Supination/Pronation: 24 (+4) / 55 (+20)  Wrist flex/ext: 42 / 50  RD/UD: 6 / 30    Assessment:     Pt tolerated treatment well this date. Pt denied pain. Cont c/o stiffness, but ROM improving. Progressed with stretching as tolerated.  Good tolerance of activity. Pt cont to present with occasional pain, stiffness, decreased ROM, weakness, and limited functionally with L hand. Good compliance with HEP, therapy attendance, and orthosis wear. Pt making good progress towards goals. Pt would cont to benefit from skilled OT services to maximize functional use of L hand.     Patient Education/Response:     Cont HEP, modalities, scar management,  hand hygiene, and orthosis wear. Pt verbalized understanding of education and review of HEP.     Plans and Goals:     Cont OT poc 2-3x/week for 8 weeks during certification period 4/3/18 to 6/3/18 in pursuit of established goals. Progress as tolerated and per MD orders.     STGs (4 weeks):  1)  Initiate HEP, modalities, and fabricate orthosis  2) Increase ROM of fingers, wrist, and FA 5-10 degrees to increase functional hand use for ADLs/work/leisure activities  3)   Decrease edema .2-.3 mm to increase joint mobility /flexibility for functional hand use.  4)  Pt will report pain no higher than 4/10 at the worst during ADLs/IADLs     Goals: ( 8 weeks)   1)  Patient to be IND with HEP and modalities for pain management  2)  Pt will demonstrate full L composite fist in order to  items needed for daily activities  3)  Pt will demonstrate L wrist and FA AROM WFL in order to perform ADLs/IADLs  4)   and pinch to be assessed when appropriate, and goals  set accordingly  5)  Pt will achieve FOTO score no higher than 35% impairment with L UE function    TRENTON Benz

## 2018-05-03 ENCOUNTER — CLINICAL SUPPORT (OUTPATIENT)
Dept: REHABILITATION | Facility: HOSPITAL | Age: 33
End: 2018-05-03
Attending: SURGERY
Payer: MEDICAID

## 2018-05-03 DIAGNOSIS — M79.89 SWELLING OF LEFT HAND: ICD-10-CM

## 2018-05-03 DIAGNOSIS — M25.432 SWELLING OF WRIST JOINT, LEFT: ICD-10-CM

## 2018-05-03 DIAGNOSIS — M25.532 LEFT WRIST PAIN: ICD-10-CM

## 2018-05-03 PROCEDURE — 97530 THERAPEUTIC ACTIVITIES: CPT | Mod: PO

## 2018-05-03 NOTE — PROGRESS NOTES
"OT DAILY TREATMENT NOTE    DATE: 5/3/2018    Start Time:  1:00  Stop Time:  1:50  Visit:10/25, expires 6/8/18    PROCEDURES:    TIMED  Procedure Min.   TA 45                     UNTIMED  Procedure Min.   Paraffin 5         Total Timed Minutes:  45  Total Timed Units:  3  Total Untimed Units:  0  Charges Billed/# of units:  3TA      Progress/Current Status    Subjective:     Patient ID: Sarah Yee is a 33 y.o. female.  Diagnosis:   1. Left wrist pain     2. Swelling of left hand     3. Swelling of wrist joint, left       Pain: 0 /10  Pt states "It's doing better. I can move it a little more." Pt reports compliance with HEP and orthosis wear.     Objective:     **Pt is 5 weeks and 1 days post op; Closed scar. Mild dermatitis noted on volar forearm   Pt wearing orthosis to clinic.     Pt seen by OT this session. Treatment consisted of the following:    Patient received paraffin bath to L hand(s) for 5 minutes to increase blood flow, circulation, pain management and for tissue elasticity prior to therex.   Performed scar massage to volar wrist/forearm incision area for 5 minutes to decrease adhesions and improve tensile glide. Pt received passive stretch to wrist and forearm as tolerated.        Date: 5/3/18    Visit: 10/25; expires 6/8/18   PROM to all digits as tolerated 10 reps each   Wrist  AA/AROM (ext/flex/RD/UD), as tolerated 10 reps each   Tendon glides (intrinsic +/-, flat fist, composite fist) 10 reps   Pinky slides 10 reps   Finger abd/add 10 reps   Digit ext off table 10 reps each   Wrist ext while holding unweighted roll, to isolate wrist extensors 10 reps   supination/pronation AROM, pain free 10 reps   Place and hold for wrist and finger ext 10 reps, with 5 sec hold each   Supination/pronation with dowel 10 reps   Wrist dexerciser 5 reps        Measurements taken 4/24/18:  Edema:  Mild edema noted in hand, wrist and FA; minimal swelling noted in fingers  L  MP's: 18.3 (-0.5)  PPC: 18.6 (+0.1)  PWC: 16.7 " (-0.3)         Range of Motion:  Composite fist WFL  Digits WFL     Thumb opposition: WFL but tight  Thumb ext: WFL  Thumb abd: WNL  Thumb MP: 33  Thumb IP: 43     Supination/Pronation: 24 (+4) / 55 (+20)  Wrist flex/ext: 42 / 50  RD/UD: 6 / 30    Assessment:     Pt tolerated treatment well this date. Pt denied pain. Cont c/o mild wrist stiffness, but ROM improving. Cont to progress with stretching as tolerated.  Good tolerance of activity. Pt cont to present with occasional pain, stiffness, decreased ROM, weakness, and limited functionally with L hand. Good compliance with HEP, therapy attendance, and orthosis wear. Pt making good progress towards goals. Pt would cont to benefit from skilled OT services to maximize functional use of L hand.     Patient Education/Response:     Cont HEP, modalities, scar management,  hand hygiene, and orthosis wear. Pt verbalized understanding of education and review of HEP.     Plans and Goals:     Cont OT poc 2-3x/week for 8 weeks during certification period 4/3/18 to 6/3/18 in pursuit of established goals. Progress as tolerated and per MD orders. FOTO next visit    STGs (4 weeks):  1)  Initiate HEP, modalities, and fabricate orthosis---met  2) Increase ROM of fingers, wrist, and FA 5-10 degrees to increase functional hand use for ADLs/work/leisure activities---met  3)   Decrease edema .2-.3 mm to increase joint mobility /flexibility for functional hand use.---met  4)  Pt will report pain no higher than 4/10 at the worst during ADLs/IADLs---met but not consistent     Goals: ( 8 weeks)   1)  Patient to be IND with HEP and modalities for pain management  2)  Pt will demonstrate full L composite fist in order to  items needed for daily activities  3)  Pt will demonstrate L wrist and FA AROM WFL in order to perform ADLs/IADLs  4)   and pinch to be assessed when appropriate, and goals set accordingly  5)  Pt will achieve FOTO score no higher than 35% impairment with L UE  function    TRENTON Benz

## 2018-05-07 ENCOUNTER — CLINICAL SUPPORT (OUTPATIENT)
Dept: REHABILITATION | Facility: HOSPITAL | Age: 33
End: 2018-05-07
Attending: SURGERY
Payer: MEDICAID

## 2018-05-07 DIAGNOSIS — M79.89 SWELLING OF LEFT HAND: ICD-10-CM

## 2018-05-07 DIAGNOSIS — M25.432 SWELLING OF WRIST JOINT, LEFT: ICD-10-CM

## 2018-05-07 DIAGNOSIS — M25.532 LEFT WRIST PAIN: ICD-10-CM

## 2018-05-07 PROCEDURE — 97530 THERAPEUTIC ACTIVITIES: CPT | Mod: PO

## 2018-05-07 NOTE — PROGRESS NOTES
"OT DAILY TREATMENT NOTE    DATE: 5/7/2018    Start Time:  1:50  Stop Time:  2:35  Visit:11/25, expires 6/8/18    PROCEDURES:    TIMED  Procedure Min.   TA 40 (30 min 1:1, 10 min supervised)                     UNTIMED  Procedure Min.   Paraffin 5         Total Timed Minutes:  40  Total Timed Units:  3  Total Untimed Units:  0  Charges Billed/# of units:  2TA      Progress/Current Status    Subjective:     Patient ID: Sarah Yee is a 33 y.o. female.  Diagnosis:   1. Left wrist pain     2. Swelling of left hand     3. Swelling of wrist joint, left       Pain: 0 /10  Pt states "It's doing fine. I can move it a little more." Pt reports compliance with HEP and orthosis wear.     Objective:     **Pt is 5 weeks and 5 days post op;    Pt wearing orthosis to clinic.     Pt seen by OT this session. Treatment consisted of the following:    Patient received paraffin bath to L hand(s) for 5 minutes to increase blood flow, circulation, pain management and for tissue elasticity prior to therex.   Performed scar massage to volar wrist/forearm incision area for 5 minutes to decrease adhesions and improve tensile glide. Pt received passive stretch to wrist and forearm as tolerated.        Date: 5/7/18    Visit: 11/25; expires 6/8/18   PROM to all digits as tolerated 10 reps each   Wrist  AA/AROM (ext/flex/RD/UD), as tolerated 10 reps each   Tendon glides (intrinsic +/-, flat fist, composite fist) 10 reps   Pinky slides 10 reps   Finger abd/add 10 reps   Digit ext off table 10 reps each   Wrist ext while holding unweighted roll, to isolate wrist extensors 10 reps   supination/pronation AROM, pain free 10 reps   Place and hold for wrist and finger ext 10 reps, with 5 sec hold each   Supination/pronation with dowel 10 reps   Wrist dexerciser 10 reps        Measurements taken 4/24/18:  Edema:  Mild edema noted in hand, wrist and FA; minimal swelling noted in fingers  L  MP's: 18.3 (-0.5)  PPC: 18.6 (+0.1)  PWC: 16.7 (-0.3)         Range " of Motion:  Composite fist WFL  Digits WFL     Thumb opposition: WFL but tight  Thumb ext: WFL  Thumb abd: WNL  Thumb MP: 33  Thumb IP: 43     Supination/Pronation: 24 (+4) / 55 (+20)  Wrist flex/ext: 42 / 50  RD/UD: 6 / 30    Assessment:     Pt tolerated treatment well this date. Pt denied pain. Cont c/o mild wrist stiffness, but ROM improving. Cont to progress with stretching as tolerated.  Pt cont to present with occasional pain, stiffness, decreased ROM, weakness, and limited functionally with L hand. Good compliance with HEP, therapy attendance, and orthosis wear. Pt making good progress towards goals. Pt would cont to benefit from skilled OT services to maximize functional use of L hand.     Patient Education/Response:     Cont HEP, modalities, scar management,  hand hygiene, and orthosis wear. Pt verbalized understanding of education and review of HEP.     Plans and Goals:     Cont OT poc 2-3x/week for 8 weeks during certification period 4/3/18 to 6/3/18 in pursuit of established goals. Progress as tolerated and per MD orders. FOTO next visit    STGs (4 weeks):  1)  Initiate HEP, modalities, and fabricate orthosis---met  2) Increase ROM of fingers, wrist, and FA 5-10 degrees to increase functional hand use for ADLs/work/leisure activities---met  3)   Decrease edema .2-.3 mm to increase joint mobility /flexibility for functional hand use.---met  4)  Pt will report pain no higher than 4/10 at the worst during ADLs/IADLs---met but not consistent     Goals: ( 8 weeks)   1)  Patient to be IND with HEP and modalities for pain management  2)  Pt will demonstrate full L composite fist in order to  items needed for daily activities  3)  Pt will demonstrate L wrist and FA AROM WFL in order to perform ADLs/IADLs  4)   and pinch to be assessed when appropriate, and goals set accordingly  5)  Pt will achieve FOTO score no higher than 35% impairment with L UE function    TRENTON Benz

## 2018-05-09 ENCOUNTER — CLINICAL SUPPORT (OUTPATIENT)
Dept: REHABILITATION | Facility: HOSPITAL | Age: 33
End: 2018-05-09
Attending: SURGERY
Payer: MEDICAID

## 2018-05-09 DIAGNOSIS — M25.432 SWELLING OF WRIST JOINT, LEFT: ICD-10-CM

## 2018-05-09 DIAGNOSIS — M79.89 SWELLING OF LEFT HAND: ICD-10-CM

## 2018-05-09 DIAGNOSIS — M25.532 LEFT WRIST PAIN: ICD-10-CM

## 2018-05-09 PROCEDURE — 97530 THERAPEUTIC ACTIVITIES: CPT | Mod: PO

## 2018-05-09 NOTE — PATIENT INSTRUCTIONS
[facility]  OCCUPATIONAL THERAPY  HOME EXERCISE PROGRAM     Complete the following strengthening program 1x/day.     Perform the following for 1-2 minutes.       Squeeze for count of 5, for 10 reps          Pinch 10-15 times           _       [facility]  OCCUPATIONAL THERAPY  HOME EXERCISE PROGRAM     Complete the following strengthening exercises using 1-2 pound weight.  Do 10 repetitions of each, 1x per day.     Resisted Forearm Rotation  Use a light weight. Slowly rotate hand to one side then the other.      Resisted Wrist Flexion  With palm up and weight in hand, bend wrist up. Return slowly.      Resisted Wrist Extension  With palm down and weight in hand, bend wrist up. Then bend wrist down.      Resisted Wrist Deviation  With thumb up and weight in hand, bend wrist up. Return slowly.    Copyright © I. All rights reserved.         TERNTON Benz  Occupational Therapist

## 2018-05-09 NOTE — PROGRESS NOTES
"OT DAILY TREATMENT NOTE    DATE: 5/9/2018    Start Time:  1:55  Stop Time:  2:50  Visit:12/25, expires 6/8/18    PROCEDURES:    TIMED  Procedure Min.   TA 50                     UNTIMED  Procedure Min.   Paraffin 5         Total Timed Minutes:  50  Total Timed Units:  3  Total Untimed Units:  0  Charges Billed/# of units:  3TA      Progress/Current Status    Subjective:     Patient ID: Sarah Yee is a 33 y.o. female.  Diagnosis:   1. Left wrist pain     2. Swelling of left hand     3. Swelling of wrist joint, left       Pain: 0 /10  Pt states "It's doing fine. I can move it a little more." Pt reports compliance with HEP and orthosis wear. Per MD orders, can increase intensity and advance with light strengthening.     Objective:     **Pt is 6 weeks and 0 days post op;    Pt wearing orthosis to clinic.     Pt seen by OT this session. Treatment consisted of the following:    Patient received paraffin bath to L hand(s) for 5 minutes to increase blood flow, circulation, pain management and for tissue elasticity prior to therex.   Performed scar massage to volar wrist/forearm incision area for 5 minutes to decrease adhesions and improve tensile glide. Pt received passive stretch to wrist and forearm as tolerated.        Date: 5/9/18    Visit: 12/25; expires 6/8/18   PROM to all digits as tolerated 10 reps each   Wrist  AA/AROM (ext/flex/RD/UD), as tolerated 10 reps each   Tendon glides (intrinsic +/-, flat fist, composite fist) 10 reps   Pinky slides 10 reps   Finger abd/add 10 reps   Digit ext off table 10 reps each   Wrist ext while holding unweighted roll, to isolate wrist extensors 10 reps   supination/pronation AROM, pain free 10 reps   Place and hold for wrist and finger ext 10 reps, with 5 sec hold each   Supination/pronation with dowel 10 reps   Wrist dexerciser 10 reps   Flexbar, sup/pro, red 10 reps each   Wrist PRE, 1#, flex/ext/RD/UD 10  Reps each   theraputty - yellow    -molding 2 min   -Gross gripping 10 " reps   -log rolls with 3 point pinch 1 set   -lateral key pinches 10 reps            Measurements taken 4/24/18:  Edema:  Mild edema noted in hand, wrist and FA; minimal swelling noted in fingers  L  MP's: 18.3 (-0.5)  PPC: 18.6 (+0.1)  PWC: 16.7 (-0.3)         Range of Motion:  Composite fist WFL  Digits WFL     Thumb opposition: WFL but tight  Thumb ext: WFL  Thumb abd: WNL  Thumb MP: 33  Thumb IP: 43     Supination/Pronation: 24 (+4) / 55 (+20)  Wrist flex/ext: 42 / 50  RD/UD: 6 / 30    Assessment:     Pt tolerated treatment well this date. Pt denied pain. Advanced with light strengthening.  Cont c/o mild wrist stiffness, but ROM improving. Cont to progress with stretching as tolerated.  Pt cont to present with occasional pain, stiffness, decreased ROM, weakness, and limited functionally with L hand. Good compliance with HEP, therapy attendance, and orthosis wear. Pt making good progress towards goals. Pt would cont to benefit from skilled OT services to maximize functional use of L hand.     Patient Education/Response:     Cont HEP, modalities, scar management,  hand hygiene, and orthosis wear. Pt verbalized understanding of education and review of HEP.     Plans and Goals:     Cont OT poc 2-3x/week for 8 weeks during certification period 4/3/18 to 6/3/18 in pursuit of established goals. Progress as tolerated and per MD orders.     STGs (4 weeks):  1)  Initiate HEP, modalities, and fabricate orthosis---met  2) Increase ROM of fingers, wrist, and FA 5-10 degrees to increase functional hand use for ADLs/work/leisure activities---met  3)   Decrease edema .2-.3 mm to increase joint mobility /flexibility for functional hand use.---met  4)  Pt will report pain no higher than 4/10 at the worst during ADLs/IADLs---met but not consistent     Goals: ( 8 weeks)   1)  Patient to be IND with HEP and modalities for pain management  2)  Pt will demonstrate full L composite fist in order to  items needed for daily  activities  3)  Pt will demonstrate L wrist and FA AROM WFL in order to perform ADLs/IADLs  4)   and pinch to be assessed when appropriate, and goals set accordingly  5)  Pt will achieve FOTO score no higher than 35% impairment with L UE function    TRENTON Benz

## 2018-05-14 ENCOUNTER — CLINICAL SUPPORT (OUTPATIENT)
Dept: REHABILITATION | Facility: HOSPITAL | Age: 33
End: 2018-05-14
Attending: SURGERY
Payer: MEDICAID

## 2018-05-14 DIAGNOSIS — M25.432 SWELLING OF WRIST JOINT, LEFT: ICD-10-CM

## 2018-05-14 DIAGNOSIS — M25.532 LEFT WRIST PAIN: ICD-10-CM

## 2018-05-14 DIAGNOSIS — M79.89 SWELLING OF LEFT HAND: ICD-10-CM

## 2018-05-14 PROCEDURE — 97530 THERAPEUTIC ACTIVITIES: CPT | Mod: PO

## 2018-05-14 NOTE — PROGRESS NOTES
"OT DAILY TREATMENT NOTE    DATE: 5/14/2018    Start Time:  1:00  Stop Time:  1:55  Visit:13/25, expires 6/8/18    PROCEDURES:    TIMED  Procedure Min.   TA 50                     UNTIMED  Procedure Min.   Paraffin 5         Total Timed Minutes:  50  Total Timed Units:  3  Total Untimed Units:  0  Charges Billed/# of units:  3TA      Progress/Current Status    Subjective:     Patient ID: Sarah Yee is a 33 y.o. female.  Diagnosis:   1. Left wrist pain     2. Swelling of left hand     3. Swelling of wrist joint, left       Pain: 0 /10  Pt states "It's doing ok. I can move it a little more. I've been trying to do a little more with it" Pt reports compliance with HEP and orthosis wear. Per MD orders, can increase intensity and advance with light strengthening.     Objective:     **Pt is 6 weeks and 5 days post op;    Pt wearing orthosis to clinic.     Pt seen by OT this session. Treatment consisted of the following:    Patient received paraffin bath to L hand(s) for 5 minutes to increase blood flow, circulation, pain management and for tissue elasticity prior to therex.   Pt received passive stretch to wrist and forearm as tolerated, and PROM x 10 reps each.        Date: 5/14/18    Visit: 13/25; expires 6/8/18   PROM to wrist and forearm, all planes 10 reps each   Wrist  AA/AROM (ext/flex/RD/UD), as tolerated 10 reps each   Tendon glides (intrinsic +/-, flat fist, composite fist) 10 reps   Pinky slides 10 reps   Wrist ext while holding unweighted roll, to isolate wrist extensors 10 reps   supination/pronation AROM, pain free 10 reps   Supination/pronation with dowel 10 reps   Digirflex, green, 5# 10 reps   Digiextend, blue band 10 reps   PHG, 25# 10 reps   Wrist dexerciser 10 reps   Flexbar, sup/pro, red 10 reps each   Wrist PRE, 1#, flex/ext/RD/UD 10  Reps each   theraputty - yellow    -molding 2 min   -Gross gripping 10 reps   -log rolls with 3 point pinch 1 set   -lateral key pinches 10 reps   Clothespin, green, 4#, " for lateral key and 3 point pinches 10 reps each        Measurements taken 4/24/18, and today 5/14/18: (improvement since eval)     Supination/Pronation: 60 (+40) / 78 (+43)  Wrist flex/ext: 60 (+18) / 55 (+5)  RD/UD: 12 (+6) / 36 (+6)     strength in lbs (position II):  R) 68  L) 40    Pinch strength in psi (R/L):  Lateral key: 14.5 / 10  3 point: 16.5 / 11.5  2 point: 12 / 8    Assessment:     Pt tolerated treatment well this date. Pt denied pain. Cont to advance with light strengthening. Fair  and pinch strength noted. Cont c/o mild wrist stiffness, but ROM improving. Cont to progress with stretching as tolerated.  Pt cont to present with occasional pain, stiffness, decreased ROM, weakness, and limited functionally with L hand. Good compliance with HEP, therapy attendance, and orthosis wear. Pt weaning out of orthosis. Pt making good progress towards goals. Pt would cont to benefit from skilled OT services to maximize functional use of L hand.     Patient Education/Response:     Cont HEP, modalities, scar management,  hand hygiene, and orthosis wear. Pt verbalized understanding of education and review of HEP.     Plans and Goals:     Cont OT poc 2-3x/week for 8 weeks during certification period 4/3/18 to 6/3/18 in pursuit of established goals. Progress as tolerated and per MD orders.     STGs (4 weeks):  1)  Initiate HEP, modalities, and fabricate orthosis---met  2) Increase ROM of fingers, wrist, and FA 5-10 degrees to increase functional hand use for ADLs/work/leisure activities---met  3)   Decrease edema .2-.3 mm to increase joint mobility /flexibility for functional hand use.---met  4)  Pt will report pain no higher than 4/10 at the worst during ADLs/IADLs---met but not consistent     Goals: ( 8 weeks)   1)  Patient to be IND with HEP and modalities for pain management  2)  Pt will demonstrate full L composite fist in order to  items needed for daily activities  3)  Pt will demonstrate L wrist and  FA AROM WFL in order to perform ADLs/IADLs  4)   and pinch to be assessed when appropriate, and goals set accordingly  5)  Pt will achieve FOTO score no higher than 35% impairment with L UE function    TRENTON Benz

## 2018-05-16 ENCOUNTER — CLINICAL SUPPORT (OUTPATIENT)
Dept: REHABILITATION | Facility: HOSPITAL | Age: 33
End: 2018-05-16
Attending: SURGERY
Payer: MEDICAID

## 2018-05-16 DIAGNOSIS — M79.89 SWELLING OF LEFT HAND: ICD-10-CM

## 2018-05-16 DIAGNOSIS — M25.532 LEFT WRIST PAIN: ICD-10-CM

## 2018-05-16 DIAGNOSIS — M25.432 SWELLING OF WRIST JOINT, LEFT: ICD-10-CM

## 2018-05-16 PROCEDURE — 97530 THERAPEUTIC ACTIVITIES: CPT | Mod: PO

## 2018-05-16 NOTE — PROGRESS NOTES
"OT DAILY TREATMENT NOTE    DATE: 5/16/2018    Start Time:  2:00  Stop Time:  2:55  Visit:14/25, expires 6/8/18    PROCEDURES:    TIMED  Procedure Min.   TA 50                     UNTIMED  Procedure Min.   Paraffin 5         Total Timed Minutes:  50  Total Timed Units:  3  Total Untimed Units:  0  Charges Billed/# of units:  3TA      Progress/Current Status    Subjective:     Patient ID: Sarah Yee is a 33 y.o. female.  Diagnosis:   1. Left wrist pain     2. Swelling of left hand     3. Swelling of wrist joint, left       Pain: 0 /10  Pt states "It's ok. I've been itchy. I don't know why, and I don't have any red marks or rash on me." Pt reports compliance with HEP and orthosis wear. Per MD orders, can increase intensity and advance with light strengthening.     Objective:     **Pt is 7 weeks and 0 days post op;     Pt seen by OT this session. Treatment consisted of the following:    Patient received paraffin bath to L hand(s) for 5 minutes to increase blood flow, circulation, pain management and for tissue elasticity prior to therex.   Pt received passive stretch to wrist and forearm as tolerated, and PROM x 10 reps each.        Date: 5/16/18    Visit: 14/25; expires 6/8/18   PROM to wrist and forearm, all planes 10 reps each   Wrist  AA/AROM (ext/flex/RD/UD), as tolerated 10 reps each   Pinky slides 10 reps   supination/pronation AROM, pain free 10 reps   Supination/pronation with dowel 10 reps   Digiflex, green, 5# 10 reps   Digiextend, blue band 10 reps   PHG, 25# 10 reps   Wrist dexerciser 10 reps   Flexbar, sup/pro, red 10 reps each   Wrist PRE, 2#, flex/ext/RD/UD 10  Reps each   theraputty - upgraded to red    -molding 2 min   -Gross gripping 10 reps   -log rolls with 3 point pinch 1 set   -lateral key pinches 10 reps   Clothespin, green, 4#, for lateral key and 3 point pinches 10 reps each        Measurements taken 4/24/18, and today 5/14/18: (improvement since eval)     Supination/Pronation: 60 (+40) / 78 " (+43)  Wrist flex/ext: 60 (+18) / 55 (+5)  RD/UD: 12 (+6) / 36 (+6)     strength in lbs (position II):  R) 68  L) 40    Pinch strength in psi (R/L):  Lateral key: 14.5 / 10  3 point: 16.5 / 11.5  2 point: 12 / 8    Assessment:     Pt tolerated treatment well this date. Pt denied pain. Cont to advance with light strengthening.  Cont c/o mild wrist stiffness, but ROM improving. Cont to progress with stretching as tolerated.  Pt cont to present with occasional pain, stiffness, decreased ROM, weakness, and limited functionally with L hand. Good compliance with HEP, therapy attendance. Pt weaning out of orthosis. Pt making good progress towards goals. Pt would cont to benefit from skilled OT services to maximize functional use of L hand.     Patient Education/Response:     Cont HEP, modalities, scar management,  hand hygiene, and orthosis wear. Pt verbalized understanding of education and review of HEP. Pt given red theraputty for HEP.    Plans and Goals:     Cont OT poc 2-3x/week for 8 weeks during certification period 4/3/18 to 6/3/18 in pursuit of established goals. Progress as tolerated and per MD orders.     STGs (4 weeks):  1)  Initiate HEP, modalities, and fabricate orthosis---met  2) Increase ROM of fingers, wrist, and FA 5-10 degrees to increase functional hand use for ADLs/work/leisure activities---met  3)   Decrease edema .2-.3 mm to increase joint mobility /flexibility for functional hand use.---met  4)  Pt will report pain no higher than 4/10 at the worst during ADLs/IADLs---met but not consistent     Goals: ( 8 weeks)   1)  Patient to be IND with HEP and modalities for pain management  2)  Pt will demonstrate full L composite fist in order to  items needed for daily activities  3)  Pt will demonstrate L wrist and FA AROM WFL in order to perform ADLs/IADLs  4)   and pinch to be assessed when appropriate, and goals set accordingly  5)  Pt will achieve FOTO score no higher than 35% impairment with  ALEX UE function    TRENTON Benz

## 2018-05-21 ENCOUNTER — CLINICAL SUPPORT (OUTPATIENT)
Dept: REHABILITATION | Facility: HOSPITAL | Age: 33
End: 2018-05-21
Attending: SURGERY
Payer: MEDICAID

## 2018-05-21 DIAGNOSIS — M79.89 SWELLING OF LEFT HAND: ICD-10-CM

## 2018-05-21 DIAGNOSIS — M25.432 SWELLING OF WRIST JOINT, LEFT: ICD-10-CM

## 2018-05-21 DIAGNOSIS — M25.532 LEFT WRIST PAIN: ICD-10-CM

## 2018-05-21 PROCEDURE — 97150 GROUP THERAPEUTIC PROCEDURES: CPT | Mod: PO

## 2018-05-21 NOTE — PROGRESS NOTES
"OT DAILY TREATMENT NOTE    DATE: 5/21/2018    Start Time:  2:00  Stop Time:  3:00   Visit:16/25, expires 6/8/18    PROCEDURES:    TIMED  Procedure Min.   TA 55                     UNTIMED  Procedure Min.   Paraffin 5         Total Timed Minutes:  55  Total Timed Units:  4  Total Untimed Units:  0  Charges Billed/# of units:  3TA      Progress/Current Status    Subjective:     Patient ID: Sarah Yee is a 33 y.o. female.  Diagnosis:   1. Left wrist pain     2. Swelling of left hand     3. Swelling of wrist joint, left       Pain: 0 /10  Pt states "Its fine just stiff"       Objective:     Pt is 7 weeks and 5 days post op;     Pt seen by OT this session. Treatment consisted of the following:    Patient received paraffin bath to L hand(s) for 5 minutes to increase blood flow, circulation, pain management and for tissue elasticity prior to therex.   Pt received passive stretch to wrist and forearm as tolerated, and PROM x 10 reps each.        Date: 5/16/18    Visit: 14/25; expires 6/8/18   PROM to wrist and forearm, all planes 20 second hold   Wrist  AA/AROM (ext/flex/RD/UD), as tolerated 20 reps each   Pinky slides 10 reps   supination/pronation AROM, with weighted dowel x 5 lbs pt to determine area to  20 reps   Supination/pronation with dowel 10 reps   Digiflex, green, 5# 10 reps   Digiextend, blue band 10 reps   PHG, 25# 10 reps   Wrist dexerciser 10 reps   Flexbar, sup/pro, red 10 reps each   Wrist PRE, 2#, flex/ext/RD/UD 10  Reps each   theraputty - upgraded to red    -molding 2 min   -Gross gripping 10 reps   -log rolls with 3 point pinch 1 set   -lateral key pinches 10 reps   Clothespin, green, 4#, for lateral key and 3 point pinches 10 reps each        Measurements taken 4/24/18, and today 5/14/18: (improvement since eval)     Supination/Pronation: 60 (+40) / 78 (+43)  Wrist flex/ext: 60 (+18) / 55 (+5)  RD/UD: 12 (+6) / 36 (+6)     strength in lbs (position II):  R) 68  L) 40    Pinch strength in psi " (R/L):  Lateral key: 14.5 / 10  3 point: 16.5 / 11.5  2 point: 12 / 8    Assessment:     Pt tolerated treatment well this date. Pt demonstrated good jordyn of wrist AROM/PROM. Therex well jordyn. Pt was introduced to increased reps. for AROM no pain noted. Supination presents as most notable deficit with AROM. Pt indicates he is not sleeping in brace currently. Pt making good progress towards goals. Pt would cont to benefit from skilled OT services to maximize functional use of L hand.     Patient Education/Response:     Cont HEP, modalities, scar management,  hand hygiene, and orthosis wear. Pt verbalized understanding of education and review of HEP. Pt given red theraputty for HEP.    Plans and Goals:     Cont OT poc 2-3x/week for 8 weeks during certification period 4/3/18 to 6/3/18 in pursuit of established goals. Progress as tolerated and per MD orders.     STGs (4 weeks):  1)  Initiate HEP, modalities, and fabricate orthosis---met  2) Increase ROM of fingers, wrist, and FA 5-10 degrees to increase functional hand use for ADLs/work/leisure activities---met  3)   Decrease edema .2-.3 mm to increase joint mobility /flexibility for functional hand use.---met  4)  Pt will report pain no higher than 4/10 at the worst during ADLs/IADLs---met but not consistent     Goals: ( 8 weeks)   1)  Patient to be IND with HEP and modalities for pain management  2)  Pt will demonstrate full L composite fist in order to  items needed for daily activities  3)  Pt will demonstrate L wrist and FA AROM WFL in order to perform ADLs/IADLs  4)   and pinch to be assessed when appropriate, and goals set accordingly  5)  Pt will achieve FOTO score no higher than 35% impairment with L UE function    TRENTON Toussaint

## 2018-05-23 ENCOUNTER — CLINICAL SUPPORT (OUTPATIENT)
Dept: REHABILITATION | Facility: HOSPITAL | Age: 33
End: 2018-05-23
Attending: SURGERY
Payer: MEDICAID

## 2018-05-23 DIAGNOSIS — M25.532 LEFT WRIST PAIN: ICD-10-CM

## 2018-05-23 DIAGNOSIS — M79.89 SWELLING OF LEFT HAND: ICD-10-CM

## 2018-05-23 DIAGNOSIS — M25.432 SWELLING OF WRIST JOINT, LEFT: ICD-10-CM

## 2018-05-23 PROCEDURE — 97150 GROUP THERAPEUTIC PROCEDURES: CPT | Mod: PO

## 2018-05-23 NOTE — PROGRESS NOTES
"OT DAILY TREATMENT NOTE    DATE: 5/23/2018    Start Time:  3:00  Stop Time:  4:00   Visit:16/25, expires 6/8/18    PROCEDURES:    TIMED  Procedure Min.   TA 55                     UNTIMED  Procedure Min.   Paraffin 5         Total Timed Minutes:  55  Total Timed Units:  4  Total Untimed Units:  0  Charges Billed/# of units:  3TA      Progress/Current Status    Subjective:     Patient ID: Sarah Yee is a 33 y.o. female.  Diagnosis:   1. Left wrist pain     2. Swelling of left hand     3. Swelling of wrist joint, left       Pain: 0 /10  Pt states "Its even less stiff now. I am eager to get back to work"       Objective:     Pt is 8  weeks 0 days post op;     Pt seen by OT this session. Treatment consisted of the following:    Patient received paraffin bath to L hand(s) for 5 minutes to increase blood flow, circulation, pain management and for tissue elasticity prior to therex.   Pt received passive stretch to wrist and forearm as tolerated, and PROM x 10 reps each.        Date: 5/16/18    Visit: 14/25; expires 6/8/18   PROM to wrist and forearm, all planes 20 second hold   Wrist  AA/AROM (ext/flex/RD/UD), as tolerated 20 reps each   Pinky slides 10 reps   supination/pronation AROM, with weighted dowel x 5 lbs pt to determine area to  20 reps   Supination/pronation with dowel 10 reps   Digiflex, green, 5# 10 reps   Digiextend, blue band 10 reps   PHG, 25# 10 reps   Wrist dexerciser 10 reps   Flexbar, sup/pro, red 10 reps each   Wrist PRE, 2#, flex/ext/RD/UD 10  Reps each   theraputty - upgraded to red    -molding 2 min   -Gross gripping 10 reps   -log rolls with 3 point pinch 1 set   -lateral key pinches 10 reps   Clothespin, green, 4#, for lateral key and 3 point pinches 10 reps each        Measurements taken 4/24/18, and today 5/14/18: (improvement since eval)     Supination/Pronation: 60 (+40) / 78 (+43)  Wrist flex/ext: 60 (+18) / 55 (+5)  RD/UD: 12 (+6) / 36 (+6)     strength in lbs (position II):  R) " 68  L) 40    Pinch strength in psi (R/L):  Lateral key: 14.5 / 10  3 point: 16.5 / 11.5  2 point: 12 / 8    Assessment:     Pt continues to demonstrate excellent jordyn. of therapeutic activities. No pain or difficulty observed during session. The patient indicates she Is motivated to return to work. Pt will follow up with MD on 6/7 for final x-ray and possible discharge/work bren. The patinet would benefit form continued therapy to address gross strength and finalize HEP pending MD medical clearance.     Patient Education/Response:     Cont HEP, modalities, scar management,  hand hygiene, and orthosis wear. Pt verbalized understanding of education and review of HEP. Pt given red theraputty for HEP.    Plans and Goals:     Cont OT poc 2-3x/week for 8 weeks during certification period 4/3/18 to 6/3/18 in pursuit of established goals. Progress as tolerated and per MD orders.     STGs (4 weeks):  1)  Initiate HEP, modalities, and fabricate orthosis---met  2) Increase ROM of fingers, wrist, and FA 5-10 degrees to increase functional hand use for ADLs/work/leisure activities---met  3)   Decrease edema .2-.3 mm to increase joint mobility /flexibility for functional hand use.---met  4)  Pt will report pain no higher than 4/10 at the worst during ADLs/IADLs---met but not consistent     Goals: ( 8 weeks)   1)  Patient to be IND with HEP and modalities for pain management  2)  Pt will demonstrate full L composite fist in order to  items needed for daily activities  3)  Pt will demonstrate L wrist and FA AROM WFL in order to perform ADLs/IADLs  4)   and pinch to be assessed when appropriate, and goals set accordingly  5)  Pt will achieve FOTO score no higher than 35% impairment with L UE function    TRENTON Toussaint

## 2018-05-28 ENCOUNTER — CLINICAL SUPPORT (OUTPATIENT)
Dept: REHABILITATION | Facility: HOSPITAL | Age: 33
End: 2018-05-28
Attending: SURGERY
Payer: MEDICAID

## 2018-05-28 DIAGNOSIS — M25.432 SWELLING OF WRIST JOINT, LEFT: ICD-10-CM

## 2018-05-28 DIAGNOSIS — M25.532 LEFT WRIST PAIN: ICD-10-CM

## 2018-05-28 DIAGNOSIS — M79.89 SWELLING OF LEFT HAND: ICD-10-CM

## 2018-05-28 PROCEDURE — 97530 THERAPEUTIC ACTIVITIES: CPT | Mod: PO

## 2018-05-28 NOTE — PROGRESS NOTES
"OT DAILY TREATMENT NOTE    DATE: 5/28/2018    Start Time:  1:00  Stop Time:   1:55  Visit:17/25, expires 6/8/18    PROCEDURES:    TIMED  Procedure Min.   TA 50                     UNTIMED  Procedure Min.   Paraffin 5         Total Timed Minutes:  50  Total Timed Units:  3  Total Untimed Units:  0  Charges Billed/# of units:  3TA      Progress/Current Status    Subjective:     Patient ID: Sarah Yee is a 33 y.o. female.  Diagnosis:   1. Left wrist pain     2. Swelling of left hand     3. Swelling of wrist joint, left       Pain: 0 /10  Pt states "It's getting better. Hopefully I can get back to work soon."  Pt reports compliance with HEP.        Objective:     Pt is 8  Weeks+ post op    Pt seen by OT this session. Treatment consisted of the following:    Patient received paraffin bath to L hand(s) for 5 minutes to increase blood flow, circulation, pain management and for tissue elasticity prior to therex.   Pt received passive stretch to wrist and forearm as tolerated, and PROM x 10 reps each, and scar management with min vibratory scar massager x 5 min.        Date: 5/28/18    Visit: 17/25; expires 6/8/18   PROM to wrist and forearm, all planes 20 second hold   Wrist  AA/AROM (ext/flex/RD/UD), as tolerated 20 reps each   Pinky slides 10 reps   Supination/pronation with hammer stretch 10 reps   Digiflex, green, 5# 10 reps   Digiextend, blue band 10 reps   PHG, 25# 10 reps   Wrist dexerciser 10 reps   Flexbar, sup/pro, red 10 reps each   Wrist PRE, 3#, flex/ext/RD/UD 10  Reps each   theraputty - upgraded to red    -molding 2 min   -Gross gripping 10 reps   -log rolls with 3 point pinch 1 set   -lateral key pinches 10 reps   Clothespin, green, 4#, for lateral key and 3 point pinches 10 reps each        Measurements taken 4/24/18, and today 5/14/18: (improvement since eval)     Supination/Pronation: 60 (+40) / 78 (+43)  Wrist flex/ext: 60 (+18) / 55 (+5)  RD/UD: 12 (+6) / 36 (+6)     strength in lbs (position " II):  R) 68  L) 40    Pinch strength in psi (R/L):  Lateral key: 14.5 / 10  3 point: 16.5 / 11.5  2 point: 12 / 8    Assessment:     Pt continues to demonstrate good tolerance of tx. No pain or difficulty observed during session.  Pt will follow up with MD on 6/7 for final x-ray and possible discharge/work clearence. Wrist AROM cont to slowly improve. Reassess and update poc next visit.  Pt making good progress towards goals. Pt cont to present with stiffness, limited ROM, weakness, and limited functional use of L hand.  The patient would benefit form continued therapy to address gross strength and HEP pending MD medical clearance.     Patient Education/Response:     Cont HEP, modalities, scar management,  hand hygiene, and orthosis wear. Pt verbalized understanding of education and review of HEP.     Plans and Goals:     Cont OT poc 2-3x/week for 8 weeks during certification period 4/3/18 to 6/3/18 in pursuit of established goals. Progress as tolerated and per MD orders. R/a and update poc at next visit.    STGs (4 weeks):  1)  Initiate HEP, modalities, and fabricate orthosis---met  2) Increase ROM of fingers, wrist, and FA 5-10 degrees to increase functional hand use for ADLs/work/leisure activities---met  3)   Decrease edema .2-.3 mm to increase joint mobility /flexibility for functional hand use.---met  4)  Pt will report pain no higher than 4/10 at the worst during ADLs/IADLs---met but not consistent     Goals: ( 8 weeks)   1)  Patient to be IND with HEP and modalities for pain management  2)  Pt will demonstrate full L composite fist in order to  items needed for daily activities  3)  Pt will demonstrate L wrist and FA AROM WFL in order to perform ADLs/IADLs  4)   and pinch to be assessed when appropriate, and goals set accordingly  5)  Pt will achieve FOTO score no higher than 35% impairment with L UE function    TRENTON Benz

## 2018-05-30 ENCOUNTER — CLINICAL SUPPORT (OUTPATIENT)
Dept: REHABILITATION | Facility: HOSPITAL | Age: 33
End: 2018-05-30
Attending: SURGERY
Payer: MEDICAID

## 2018-05-30 DIAGNOSIS — M25.432 SWELLING OF WRIST JOINT, LEFT: ICD-10-CM

## 2018-05-30 DIAGNOSIS — M25.532 LEFT WRIST PAIN: ICD-10-CM

## 2018-05-30 DIAGNOSIS — M79.89 SWELLING OF LEFT HAND: ICD-10-CM

## 2018-05-30 PROCEDURE — 97530 THERAPEUTIC ACTIVITIES: CPT | Mod: PO

## 2018-05-30 NOTE — PROGRESS NOTES
"OT DAILY TREATMENT NOTE    DATE: 5/30/2018    Start Time:  1:00  Stop Time:   1:55  Visit:18/25, expires 6/8/18    PROCEDURES:    TIMED  Procedure Min.   TA 50                     UNTIMED  Procedure Min.   Paraffin 5         Total Timed Minutes:  50  Total Timed Units:  3  Total Untimed Units:  0  Charges Billed/# of units:  3TA      Progress/Current Status    Subjective:     Patient ID: Sarah Yee is a 33 y.o. female.  Diagnosis:   1. Left wrist pain     2. Swelling of left hand     3. Swelling of wrist joint, left       Pain: 0 /10  Pt states "It's getting better. Hopefully I can get back to work soon."  Pt reports compliance with HEP.        Objective:     Pt is 8  Weeks+ post op    Pt seen by OT this session. Treatment consisted of the following:    Patient received paraffin bath to L hand(s) for 5 minutes to increase blood flow, circulation, pain management and for tissue elasticity prior to therex.   Pt received passive stretch to wrist and forearm as tolerated, and PROM x 10 reps each, and scar management with min vibratory scar massager x 5 min.        Date: 5/30/18    Visit: 18/25; expires 6/8/18   PROM to wrist and forearm, all planes 20 second hold   Wrist  AA/AROM (ext/flex/RD/UD), as tolerated 20 reps each   Pinky slides 10 reps   Supination/pronation with hammer stretch 10 reps   Digiflex, blue, 7# 10 reps   Digiextend, blue band 10 reps   PHG, 35# 10 reps   Wrist dexerciser 10 reps   Flexbar, sup/pro, red 10 reps each   Wrist PRE, 3#, flex/ext/RD/UD 2 x 10  Reps each   theraputty - upgraded to red    -molding 2 min   -Gross gripping 10 reps   -log rolls with 3 point pinch 1 set   -lateral key pinches 10 reps   Clothespin, blue, 6#, for lateral key and 3 point pinches 10 reps each        Measurements taken 4/24/18, 5/14/18, and today 5/30/18: (improvement since eval)     Supination/Pronation: 64 (+44) / 88 (+53)  Wrist flex/ext: 68 (+28) / 55 (+5)  RD/UD: 14 (+8) / 36 (+6)     strength in lbs " (position II):  R) 68  L) 43 (+3)    Pinch strength in psi (R/L):  Lateral key: 14.5 / 11 (+1)  3 point: 16.5 / 12 (+0.5)  2 point: 12 / 8 (+0.5)    Assessment:     Pt continues to demonstrate good tolerance of tx. No pain or difficulty observed during session. Pt demonstrates improved AROM of wrist and fingers, improved  strength, and some improvement with pinch strength. See measurements above.  Finger, and thumb AROM WFL. Cont c/o stiffness in wrist and occasional pain.  Pt will follow up with MD on 6/7 for final x-ray and possible discharge/work clearence. Wrist AROM cont to slowly improve.  Pt making good progress towards goals. Pt cont to present with stiffness, limited ROM, weakness, and limited functional use of L hand.  The patient would benefit from continued therapy to address higher level gross strength and HEP pending MD medical clearance.     Patient Education/Response:     Cont HEP, modalities, scar management,  hand hygiene, and orthosis wear. Pt verbalized understanding of education and review of HEP.     Plans and Goals:     Cont OT poc 2x/week for 4 more weeks during certification period 6/3/18 to 7/3/18 in pursuit of established goals. Progress as tolerated and per MD orders. MD follow up on 6/7/18.    STGs (4 weeks):  1)  Initiate HEP, modalities, and fabricate orthosis---met  2) Increase ROM of fingers, wrist, and FA 5-10 degrees to increase functional hand use for ADLs/work/leisure activities---met  3)   Decrease edema .2-.3 mm to increase joint mobility /flexibility for functional hand use.---met  4)  Pt will report pain no higher than 4/10 at the worst during ADLs/IADLs---met but not consistent     Goals: ( 8 weeks)   1)  Patient to be IND with HEP and modalities for pain management---met  2)  Pt will demonstrate full L composite fist in order to  items needed for daily activities---met  3)  Pt will demonstrate L wrist and FA AROM WFL in order to perform ADLs/IADLs---partially met,  improving  4)   and pinch to be assessed when appropriate, and goals set accordingly---met   Added: Pt will demonstrate increased  strength of at least 3-5# in order to better grasp items at work and for lifting               Pt will demonstrate increased pinch strength of at least 1-3 psi in order to open water bottles and for turning keys  5)  Pt will achieve FOTO score no higher than 35% impairment with L UE function---not met    TRENTON Benz

## 2018-06-04 ENCOUNTER — CLINICAL SUPPORT (OUTPATIENT)
Dept: REHABILITATION | Facility: HOSPITAL | Age: 33
End: 2018-06-04
Attending: SURGERY
Payer: MEDICAID

## 2018-06-04 DIAGNOSIS — M25.432 SWELLING OF WRIST JOINT, LEFT: ICD-10-CM

## 2018-06-04 DIAGNOSIS — M25.532 LEFT WRIST PAIN: ICD-10-CM

## 2018-06-04 DIAGNOSIS — M79.89 SWELLING OF LEFT HAND: ICD-10-CM

## 2018-06-04 PROCEDURE — 97530 THERAPEUTIC ACTIVITIES: CPT | Mod: PO

## 2018-06-04 NOTE — PLAN OF CARE
Date: 5/30/2018      OCCUPATIONAL THERAPY UPDATED PLAN OF TREATMENT    Patient name: Sarah Yee  Onset Date:  3/28/18  SOC Date:  4/3/18  Primary Diagnosis: s/p ORIF of L  fx & ulna styloid fx  Treatment Diagnosis:    1. Left wrist pain     2. Swelling of left hand     3. Swelling of wrist joint, left       Referring Physician: Cesar Tejeda III,*  Certification Period:  4/3/18 to 6/3/18  Precautions:  Standard, progress as tolerated and per MD protocol  Visits from SOC:  18  Functional Level Prior to SOC:  Independent prior to injury  Updated Subjective: see note  Updated Objective: see note  Updated Assessment:  See note. Pt progressing well in therapy. Full composite fist and opposition. Wrist/FA  AROM nearly WFL and cont to improve.  strength improving. Cont with some , pinch, and wrist weakness. Good compliance with HEP and therapy attendance.     Previous Short Term Goals Status:   STGs (4 weeks):  1)  Initiate HEP, modalities, and fabricate orthosis---met  2) Increase ROM of fingers, wrist, and FA 5-10 degrees to increase functional hand use for ADLs/work/leisure activities---met  3)   Decrease edema .2-.3 mm to increase joint mobility /flexibility for functional hand use.---met  4)  Pt will report pain no higher than 4/10 at the worst during ADLs/IADLs---met but not consistent     Goals: ( 8 weeks)   1)  Patient to be IND with HEP and modalities for pain management---met  2)  Pt will demonstrate full L composite fist in order to  items needed for daily activities---met  3)  Pt will demonstrate L wrist and FA AROM WFL in order to perform ADLs/IADLs---partially met, improving  4)   and pinch to be assessed when appropriate, and goals set accordingly---met              Added: Pt will demonstrate increased  strength of at least 3-5# in order to better grasp items at work and for lifting                          Pt will demonstrate increased pinch strength of at least 1-3 psi in order  to open water bottles and for turning keys  5)  Pt will achieve FOTO score no higher than 35% impairment with L UE function---not met     New Short Term Goals Status:   Cont 2, 4  Long Term Goal Status:   continue per initial plan of care.  Reasons for Recertification of Therapy:   Pt making good progress towards therapy. Pt has MD appointment on 6/7/18. Pt awaiting MD clearance to retrun to work. Her jobs involves heavy lifting. Plan to cont to progress as tolerated to address higher level strengthening and wrist ROM in order to better perform job duties.     Certification Period: 6/3/18 to 7/3/18  Recommended Treatment Plan: 2 times per week for 4 weeks: Manual Therapy, Moist Heat/ Ice, Paraffin, Patient Education, Therapeutic Activites and Therapeutic Exercise  Other Recommendations: n/a      TRENTON Benz  5/30/2018      I CERTIFY THE NEED FOR THESE SERVICES FURNISHED UNDER THIS PLAN OF TREATMENT AND WHILE UNDER MY CARE    Physician's comments: ________________________________________________________________________________________________________________________________________________      Physician's Name: ___________________________________

## 2018-06-05 NOTE — PROGRESS NOTES
"OT DAILY TREATMENT NOTE    DATE: 6/4/2018    Start Time:  1:15  Stop Time:   2:00  Visit:19/25, expires 6/8/18    PROCEDURES:    TIMED  Procedure Min.   TA 40 (25 min 1:1, 15 min supervised)                     UNTIMED  Procedure Min.   Paraffin 5         Total Timed Minutes:  40  Total Timed Units:  3  Total Untimed Units:  0  Charges Billed/# of units:  2TA      Progress/Current Status    Subjective:     Patient ID: Sarah Yee is a 33 y.o. female.  Diagnosis:   1. Left wrist pain     2. Swelling of left hand     3. Swelling of wrist joint, left       Pain: 0 /10  Pt states "It's getting better."  Pt reports compliance with HEP.  Pt expresses interest in trying to get back to work.      Objective:     Pt is 8  Weeks+ post op    Pt seen by OT this session. Treatment consisted of the following:    Patient received paraffin bath to L hand(s) for 5 minutes to increase blood flow, circulation, pain management and for tissue elasticity prior to therex.   Pt received passive stretch to wrist and forearm as tolerated, and PROM x 10 reps each, and scar management with min vibratory scar massager x 5 min.        Date: 6/4/18    Visit: 19/25; expires 6/8/18   PROM to wrist and forearm, all planes 20 second hold   Wrist  AA/AROM (ext/flex/RD/UD), as tolerated 20 reps each   Pinky slides 10 reps   Supination/pronation with hammer stretch 10 reps   Digiflex, blue, 7# 10 reps   Digiextend, blue band 10 reps   PHG, 35# 10 reps   Wrist dexerciser 10 reps   Flexbar, sup/pro, red 10 reps each   Wrist PRE, 3#, flex/ext/RD/UD 2 x 10  Reps each   theraputty - upgraded to red    -molding 2 min   -Gross gripping 10 reps   -log rolls with 3 point pinch 1 set   -lateral key pinches 10 reps   Clothespin, blue, 6#, for lateral key and 3 point pinches 10 reps each        Measurements taken 4/24/18, 5/14/18, and today 5/30/18: (improvement since eval)     Supination/Pronation: 64 (+44) / 88 (+53)  Wrist flex/ext: 68 (+28) / 55 (+5)  RD/UD: 14 " (+8) / 36 (+6)     strength in lbs (position II):  R) 68  L) 43 (+3)    Pinch strength in psi (R/L):  Lateral key: 14.5 / 11 (+1)  3 point: 16.5 / 12 (+0.5)  2 point: 12 / 8 (+0.5)    Assessment:     Pt continues to demonstrate good tolerance of tx. No pain or difficulty observed during session. Pt demonstrates improved AROM of wrist and fingers, improved  strength, and some improvement with pinch strength. See measurements above.  Finger, and thumb AROM WFL. Cont c/o stiffness in wrist and occasional pain.  Pt will follow up with MD on 6/7/18 for final x-ray and possible discharge/work clearence. Wrist AROM cont to slowly improve.  Pt making good progress towards goals. Pt cont to present with stiffness, limited ROM, weakness, and limited functional use of L hand.  The patient would benefit from continued therapy to address higher level gross strength and HEP pending MD medical clearance.     Patient Education/Response:     Cont HEP, modalities, scar management,  hand hygiene, and orthosis wear. Pt verbalized understanding of education and review of HEP.     Plans and Goals:     Cont OT poc 2x/week for 4 more weeks during certification period 6/3/18 to 7/3/18 in pursuit of established goals. Progress as tolerated and per MD orders. MD follow up on 6/7/18.    STGs (4 weeks):  1)  Initiate HEP, modalities, and fabricate orthosis---met  2) Increase ROM of fingers, wrist, and FA 5-10 degrees to increase functional hand use for ADLs/work/leisure activities---met  3)   Decrease edema .2-.3 mm to increase joint mobility /flexibility for functional hand use.---met  4)  Pt will report pain no higher than 4/10 at the worst during ADLs/IADLs---met but not consistent     Goals: ( 8 weeks)   1)  Patient to be IND with HEP and modalities for pain management---met  2)  Pt will demonstrate full L composite fist in order to  items needed for daily activities---met  3)  Pt will demonstrate L wrist and FA AROM WFL in  order to perform ADLs/IADLs---partially met, improving  4)   and pinch to be assessed when appropriate, and goals set accordingly---met   Added: Pt will demonstrate increased  strength of at least 3-5# in order to better grasp items at work and for lifting               Pt will demonstrate increased pinch strength of at least 1-3 psi in order to open water bottles and for turning keys  5)  Pt will achieve FOTO score no higher than 35% impairment with L UE function---not met    TRENTON Bezn

## 2018-06-06 ENCOUNTER — CLINICAL SUPPORT (OUTPATIENT)
Dept: REHABILITATION | Facility: HOSPITAL | Age: 33
End: 2018-06-06
Attending: SURGERY
Payer: MEDICAID

## 2018-06-06 DIAGNOSIS — M25.432 SWELLING OF WRIST JOINT, LEFT: ICD-10-CM

## 2018-06-06 DIAGNOSIS — M79.89 SWELLING OF LEFT HAND: ICD-10-CM

## 2018-06-06 DIAGNOSIS — M25.532 LEFT WRIST PAIN: ICD-10-CM

## 2018-06-06 PROCEDURE — 97530 THERAPEUTIC ACTIVITIES: CPT | Mod: PO

## 2018-06-06 NOTE — PROGRESS NOTES
"OT DAILY TREATMENT NOTE    DATE: 6/6/2018    Start Time:  1:00  Stop Time:   1:50  Visit:20/25, expires 6/8/18    PROCEDURES:    TIMED  Procedure Min.   TA 45 (30 min 1:1, 15 min supervised)                     UNTIMED  Procedure Min.   Paraffin 5         Total Timed Minutes:  40  Total Timed Units:  3  Total Untimed Units:  0  Charges Billed/# of units:  2TA      Progress/Current Status    Subjective:     Patient ID: Sarah Yee is a 33 y.o. female.  Diagnosis:   1. Left wrist pain     2. Swelling of left hand     3. Swelling of wrist joint, left       Pain: 0 /10  Pt states "It's getting better."  Pt reports compliance with HEP.  Pt expresses interest in trying to get back to work.      Objective:     Pt is 8  Weeks+ post op    Pt seen by OT this session. Treatment consisted of the following:    Patient received paraffin bath to L hand(s) for 5 minutes to increase blood flow, circulation, pain management and for tissue elasticity prior to therex.   Pt received passive stretch to wrist and forearm as tolerated, and PROM x 10 reps each, and scar management with min vibratory scar massager x 5 min.        Date: 6/6/18    Visit: 20/25; expires 6/8/18   PROM to wrist and forearm, all planes 20 second hold   Wrist  AA/AROM (ext/flex/RD/UD), as tolerated 20 reps each   Pinky slides 10 reps   Supination/pronation with hammer stretch 10 reps   Digiflex, blue, 7# 10 reps   Digiextend, blue band 10 reps   PHG, 35# 10 reps   Wrist dexerciser 10 reps   Flexbar, sup/pro, red 10 reps each   Wrist PRE, 3#, flex/ext/RD/UD 2 x 10  Reps each   theraputty - upgraded to red    -molding 2 min   -Gross gripping 10 reps   -log rolls with 3 point pinch 1 set   -lateral key pinches 10 reps   Clothespin, blue, 6#, for lateral key and 3 point pinches 10 reps each        Measurements taken 4/24/18, 5/14/18, and today 5/30/18: (improvement since eval)     Supination/Pronation: 64 (+44) / 88 (+53)  Wrist flex/ext: 68 (+28) / 55 (+5)  RD/UD: 14 " (+8) / 36 (+6)     strength in lbs (position II):  R) 68  L) 43 (+3)    Pinch strength in psi (R/L):  Lateral key: 14.5 / 11 (+1)  3 point: 16.5 / 12 (+0.5)  2 point: 12 / 8 (+0.5)    Assessment:     Pt continues to demonstrate good tolerance of tx. No pain or difficulty observed during session. Pt demonstrates improved AROM of wrist and fingers, improved  strength, and some improvement with pinch strength. See measurements above.  Finger, and thumb AROM WFL. Cont c/o stiffness in wrist and occasional pain.  Pt will follow up with MD on 6/7/18 for final x-ray and possible discharge/work clearence. Wrist AROM cont to slowly improve.  Pt making good progress towards goals. Pt cont to present with stiffness, limited ROM, weakness, and limited functional use of L hand.  The patient would benefit from continued therapy to address higher level gross strength and HEP pending MD medical clearance.     Patient Education/Response:     Cont HEP, modalities, scar management,  hand hygiene, and orthosis wear. Pt verbalized understanding of education and review of HEP.     Plans and Goals:     Cont OT poc 2x/week for 4 more weeks during certification period 6/3/18 to 7/3/18 in pursuit of established goals. Progress as tolerated and per MD orders. MD follow up on 6/7/18. Requesting date extension from insurance.    STGs (4 weeks):  1)  Initiate HEP, modalities, and fabricate orthosis---met  2) Increase ROM of fingers, wrist, and FA 5-10 degrees to increase functional hand use for ADLs/work/leisure activities---met  3)   Decrease edema .2-.3 mm to increase joint mobility /flexibility for functional hand use.---met  4)  Pt will report pain no higher than 4/10 at the worst during ADLs/IADLs---met but not consistent     Goals: ( 8 weeks)   1)  Patient to be IND with HEP and modalities for pain management---met  2)  Pt will demonstrate full L composite fist in order to  items needed for daily activities---met  3)  Pt will  demonstrate L wrist and FA AROM WFL in order to perform ADLs/IADLs---partially met, improving  4)   and pinch to be assessed when appropriate, and goals set accordingly---met   Added: Pt will demonstrate increased  strength of at least 3-5# in order to better grasp items at work and for lifting               Pt will demonstrate increased pinch strength of at least 1-3 psi in order to open water bottles and for turning keys  5)  Pt will achieve FOTO score no higher than 35% impairment with L UE function---not met    TRENTON Benz

## 2018-06-11 ENCOUNTER — CLINICAL SUPPORT (OUTPATIENT)
Dept: REHABILITATION | Facility: HOSPITAL | Age: 33
End: 2018-06-11
Attending: SURGERY
Payer: MEDICAID

## 2018-06-11 DIAGNOSIS — M79.89 SWELLING OF LEFT HAND: ICD-10-CM

## 2018-06-11 DIAGNOSIS — M25.532 LEFT WRIST PAIN: ICD-10-CM

## 2018-06-11 DIAGNOSIS — M25.432 SWELLING OF WRIST JOINT, LEFT: ICD-10-CM

## 2018-06-11 PROCEDURE — 97530 THERAPEUTIC ACTIVITIES: CPT | Mod: PO

## 2018-06-11 NOTE — PROGRESS NOTES
"OT DAILY TREATMENT NOTE    DATE: 6/11/2018    Start Time:  1:05  Stop Time:  1:55  Visit:21/25, expires 6/27/18    PROCEDURES:    TIMED  Procedure Min.   TA 45                      UNTIMED  Procedure Min.   Paraffin 5         Total Timed Minutes:  45  Total Timed Units:  3  Total Untimed Units:  0  Charges Billed/# of units:  3TA      Progress/Current Status    Subjective:     Patient ID: Sarah Yee is a 33 y.o. female.  Diagnosis:   1. Left wrist pain     2. Swelling of left hand     3. Swelling of wrist joint, left       Pain: 0 /10  Pt states "It's getting better. The doctor said I can cont therapy"  Pt reports compliance with HEP.  Received MD orders to cont with therapy.       Objective:     Pt is 8  Weeks+ post op    Pt seen by OT this session. Treatment consisted of the following:    Patient received paraffin bath to L hand(s) for 5 minutes to increase blood flow, circulation, pain management and for tissue elasticity prior to therex.   Pt received passive stretch to wrist and forearm as tolerated, and PROM x 10 reps each, and scar management with min vibratory scar massager x 5 min.        Date: 6/11/18    Visit: 21/25; expires 6/27/18   PROM to wrist and forearm, all planes 20 second hold   Wrist  AA/AROM (ext/flex/RD/UD), as tolerated 20 reps each   Pinky slides 10 reps   Supination/pronation with hammer stretch 10 reps   Digiflex, blue, 7# 2 x 10 reps   Digiextend, blue band 2 x 10 reps   PHG, 35# 2 x 10 reps   Wrist dexerciser 10 reps   Flexbar, sup/pro, red 10 reps each   Wrist PRE, 3#, flex/ext/RD/UD 2 x 10  Reps each   theraputty - red    -molding 2 min   -Gross gripping 10 reps   -log rolls with 3 point pinch 1 set   -lateral key pinches 10 reps   Clothespin, blue, 6#, for lateral key and 3 point pinches 10 reps each        Measurements taken 4/24/18, 5/14/18, and today 5/30/18: (improvement since eval)     Supination/Pronation: 64 (+44) / 88 (+53)  Wrist flex/ext: 68 (+28) / 55 (+5)  RD/UD: 14 (+8) " / 36 (+6)     strength in lbs (position II):  R) 68  L) 43 (+3)    Pinch strength in psi (R/L):  Lateral key: 14.5 / 11 (+1)  3 point: 16.5 / 12 (+0.5)  2 point: 12 / 8 (+0.5)    Assessment:     Pt continues to demonstrate good tolerance of tx. No pain or difficulty observed during session. Pt demonstrates improved AROM of wrist and fingers, improved  strength, and some improvement with pinch strength.  Cont c/o stiffness in wrist and occasional pain.  Wrist AROM cont to slowly improve.  Pt making good progress towards goals. Pt cont to present with stiffness, limited ROM, weakness, and limited functional use of L hand.  The patient would benefit from continued therapy to address higher level gross strength and HEP.     Patient Education/Response:     Cont HEP, modalities, scar management,  hand hygiene, and orthosis wear. Pt verbalized understanding of education and review of HEP.     Plans and Goals:     Cont OT poc 2x/week for 4 more weeks during certification period 6/3/18 to 7/3/18 in pursuit of established goals. Progress as tolerated and per MD orders. MD orders to cont with therapy    STGs (4 weeks):  1)  Initiate HEP, modalities, and fabricate orthosis---met  2) Increase ROM of fingers, wrist, and FA 5-10 degrees to increase functional hand use for ADLs/work/leisure activities---met  3)   Decrease edema .2-.3 mm to increase joint mobility /flexibility for functional hand use.---met  4)  Pt will report pain no higher than 4/10 at the worst during ADLs/IADLs---met but not consistent     Goals: ( 8 weeks)   1)  Patient to be IND with HEP and modalities for pain management---met  2)  Pt will demonstrate full L composite fist in order to  items needed for daily activities---met  3)  Pt will demonstrate L wrist and FA AROM WFL in order to perform ADLs/IADLs---partially met, improving  4)   and pinch to be assessed when appropriate, and goals set accordingly---met   Added: Pt will demonstrate  increased  strength of at least 3-5# in order to better grasp items at work and for lifting               Pt will demonstrate increased pinch strength of at least 1-3 psi in order to open water bottles and for turning keys  5)  Pt will achieve FOTO score no higher than 35% impairment with L UE function---not met    TRENTON Benz

## 2018-06-18 ENCOUNTER — CLINICAL SUPPORT (OUTPATIENT)
Dept: REHABILITATION | Facility: HOSPITAL | Age: 33
End: 2018-06-18
Attending: SURGERY
Payer: MEDICAID

## 2018-06-18 DIAGNOSIS — M25.432 SWELLING OF WRIST JOINT, LEFT: ICD-10-CM

## 2018-06-18 DIAGNOSIS — M79.89 SWELLING OF LEFT HAND: ICD-10-CM

## 2018-06-18 DIAGNOSIS — M25.532 LEFT WRIST PAIN: ICD-10-CM

## 2018-06-18 PROCEDURE — 97530 THERAPEUTIC ACTIVITIES: CPT | Mod: PO

## 2018-06-18 NOTE — PROGRESS NOTES
"OT DAILY TREATMENT NOTE    DATE: 6/18/2018    Start Time:  1:00  Stop Time:  1:55  Visit:22/25, expires 6/27/18    PROCEDURES:    TIMED  Procedure Min.   TA 50 (35 min 1:1; 15 min supervised)                     UNTIMED  Procedure Min.   Paraffin 5         Total Timed Minutes:  50  Total Timed Units:  3  Total Untimed Units:  0  Charges Billed/# of units:  2TA      Progress/Current Status    Subjective:     Patient ID: Sarah Yee is a 33 y.o. female.  Diagnosis:   1. Left wrist pain     2. Swelling of left hand     3. Swelling of wrist joint, left       Pain: 0 /10  Pt states "I'm feeling ok today."  Pt reports compliance with HEP.  Pt reports she has been unable to wear her Apple watch on her L wrist due to "stinging/burning".      Objective:     Pt is 8  Weeks+ post op    Pt seen by OT this session. Treatment consisted of the following:    Patient received paraffin bath to L hand(s) for 5 minutes to increase blood flow, circulation, pain management and for tissue elasticity prior to therex.   Pt received passive stretch to wrist and forearm as tolerated, and PROM x 10 reps each, and scar management with min vibratory scar massager x 5 min.        Date: 6/18/18    Visit: 22/25; expires 6/27/18   PROM to wrist and forearm, all planes 20 second hold   Wrist  AA/AROM (ext/flex/RD/UD), as tolerated 20 reps each   Pinky slides 10 reps   Supination/pronation with hammer stretch 10 reps   Digiflex, blue, 7# 2 x 10 reps   Digiextend, blue band 2 x 10 reps   PHG, 35# 2 x 10 reps   Flexbar, sup/pro, green 10 reps each   Wrist PRE, 3#, flex/ext/RD/UD 2 x 10  Reps each   theraputty - red    -molding 2 min   -Gross gripping 10 reps   -log rolls with 3 point pinch 1 set   -lateral key pinches 10 reps   Clothespin, blue, 6#, for lateral key and 3 point pinches 10 reps each        Measurements taken 4/24/18, 5/14/18, and today 5/30/18: (improvement since eval)     Supination/Pronation: 64 (+44) / 88 (+53)  Wrist flex/ext: 68 " (+28) / 55 (+5)  RD/UD: 14 (+8) / 36 (+6)     strength in lbs (position II):  R) 68  L) 43 (+3)    Pinch strength in psi (R/L):  Lateral key: 14.5 / 11 (+1)  3 point: 16.5 / 12 (+0.5)  2 point: 12 / 8 (+0.5)    Assessment:     Pt continues to demonstrate good tolerance of tx. No pain or difficulty observed during session.   Cont c/o stiffness in wrist and occasional pain.  Fairly good wrist and FA AROM noted.  Pt making good progress towards goals. Pt cont to present with stiffness, limited ROM, weakness, and limited functional use of L hand.  The patient would benefit from continued therapy to address higher level gross strength and HEP.     Patient Education/Response:     Cont HEP, modalities, scar management,  hand hygiene, and orthosis wear. Pt verbalized understanding of education and review of HEP.     Plans and Goals:     Cont OT poc 2x/week for 4 more weeks during certification period 6/3/18 to 7/3/18 in pursuit of established goals. Progress as tolerated and per MD orders.     STGs (4 weeks):  1)  Initiate HEP, modalities, and fabricate orthosis---met  2) Increase ROM of fingers, wrist, and FA 5-10 degrees to increase functional hand use for ADLs/work/leisure activities---met  3)   Decrease edema .2-.3 mm to increase joint mobility /flexibility for functional hand use.---met  4)  Pt will report pain no higher than 4/10 at the worst during ADLs/IADLs---met but not consistent     Goals: ( 8 weeks)   1)  Patient to be IND with HEP and modalities for pain management---met  2)  Pt will demonstrate full L composite fist in order to  items needed for daily activities---met  3)  Pt will demonstrate L wrist and FA AROM WFL in order to perform ADLs/IADLs---partially met, improving  4)   and pinch to be assessed when appropriate, and goals set accordingly---met   Added: Pt will demonstrate increased  strength of at least 3-5# in order to better grasp items at work and for lifting               Pt will  demonstrate increased pinch strength of at least 1-3 psi in order to open water bottles and for turning keys  5)  Pt will achieve FOTO score no higher than 35% impairment with L UE function---not met    TRENTON Benz

## 2018-06-25 ENCOUNTER — CLINICAL SUPPORT (OUTPATIENT)
Dept: REHABILITATION | Facility: HOSPITAL | Age: 33
End: 2018-06-25
Attending: SURGERY
Payer: MEDICAID

## 2018-06-25 DIAGNOSIS — M25.532 LEFT WRIST PAIN: ICD-10-CM

## 2018-06-25 DIAGNOSIS — M25.432 SWELLING OF WRIST JOINT, LEFT: ICD-10-CM

## 2018-06-25 DIAGNOSIS — M79.89 SWELLING OF LEFT HAND: ICD-10-CM

## 2018-06-25 PROCEDURE — 97530 THERAPEUTIC ACTIVITIES: CPT | Mod: PO

## 2018-06-25 NOTE — PROGRESS NOTES
"OT DAILY TREATMENT NOTE    DATE: 6/25/2018    Start Time:  1:10  Stop Time:  2:00  Visit:23/25, expires 6/27/18    PROCEDURES:    TIMED  Procedure Min.   TA 50 (30 min 1:1; 20 min supervised)                     UNTIMED  Procedure Min.   Paraffin 5         Total Timed Minutes:  50  Total Timed Units:  3  Total Untimed Units:  0  Charges Billed/# of units:  2TA      Progress/Current Status    Subjective:     Patient ID: Sarah Yee is a 33 y.o. female.  Diagnosis:   1. Left wrist pain     2. Swelling of left hand     3. Swelling of wrist joint, left       Pain: 0 /10  Pt states "it feels like it's trying to get a little better."  Pt reports compliance with HEP.        Objective:       Pt seen by OT this session. Treatment consisted of the following:    Patient received paraffin bath to L hand(s) for 5 minutes to increase blood flow, circulation, pain management and for tissue elasticity prior to therex.   Pt received passive stretch to wrist and forearm as tolerated, and PROM x 10 reps each, and scar management with min vibratory scar massager x 5 min.        Date: 6/25/18    Visit: 23/25; expires 6/27/18   PROM to wrist and forearm, all planes 20 second hold   Wrist  AA/AROM (ext/flex/RD/UD), as tolerated 20 reps each   Pinky slides 10 reps   Supination/pronation with hammer stretch 10 reps   Digiflex, blue, 7# 2 x 10 reps   Digiextend, blue band 2 x 10 reps   PHG, 35# 2 x 10 reps   Flexbar, sup/pro, green 10 reps each   Wrist PRE, 3#, flex/ext/RD/UD 2 x 10  Reps each   theraputty - green, upgraded    -molding 2 min   -Gross gripping 10 reps   -log rolls with 3 point pinch 1 set   -lateral key pinches 10 reps   Clothespin, blue, 6#, for lateral key and 3 point pinches 10 reps each        Measurements taken 4/24/18, 5/14/18, and today 5/30/18: (improvement since eval)     Supination/Pronation: 64 (+44) / 88 (+53)  Wrist flex/ext: 68 (+28) / 55 (+5)  RD/UD: 14 (+8) / 36 (+6)     strength in lbs (position II):  R) " 68  L) 43 (+3)    Pinch strength in psi (R/L):  Lateral key: 14.5 / 11 (+1)  3 point: 16.5 / 12 (+0.5)  2 point: 12 / 8 (+0.5)    Assessment:     Pt continues to demonstrate good tolerance of tx. No pain or difficulty observed during session.   Good tolerance of progression of ex.  Cont c/o stiffness in wrist.  Fairly good wrist and FA AROM noted.  Pt making good progress towards goals. Pt cont to present with stiffness, limited ROM, weakness, and limited functional use of L hand.  The patient would benefit from continued therapy to address higher level gross strength and HEP.     Patient Education/Response:     Cont HEP, modalities, scar management,  hand hygiene, and orthosis wear. Pt verbalized understanding of education and review of HEP.     Plans and Goals:     Cont OT poc 2x/week for 4 more weeks during certification period 6/3/18 to 7/3/18 in pursuit of established goals. Progress as tolerated and per MD orders. Reassess for discharge next session.    STGs (4 weeks):  1)  Initiate HEP, modalities, and fabricate orthosis---met  2) Increase ROM of fingers, wrist, and FA 5-10 degrees to increase functional hand use for ADLs/work/leisure activities---met  3)   Decrease edema .2-.3 mm to increase joint mobility /flexibility for functional hand use.---met  4)  Pt will report pain no higher than 4/10 at the worst during ADLs/IADLs---met but not consistent     Goals: ( 8 weeks)   1)  Patient to be IND with HEP and modalities for pain management---met  2)  Pt will demonstrate full L composite fist in order to  items needed for daily activities---met  3)  Pt will demonstrate L wrist and FA AROM WFL in order to perform ADLs/IADLs---partially met, improving  4)   and pinch to be assessed when appropriate, and goals set accordingly---met   Added: Pt will demonstrate increased  strength of at least 3-5# in order to better grasp items at work and for lifting               Pt will demonstrate increased pinch  strength of at least 1-3 psi in order to open water bottles and for turning keys  5)  Pt will achieve FOTO score no higher than 35% impairment with L UE function---not met    TRENTON Benz

## 2018-06-27 ENCOUNTER — CLINICAL SUPPORT (OUTPATIENT)
Dept: REHABILITATION | Facility: HOSPITAL | Age: 33
End: 2018-06-27
Attending: SURGERY
Payer: MEDICAID

## 2018-06-27 DIAGNOSIS — M25.432 SWELLING OF WRIST JOINT, LEFT: ICD-10-CM

## 2018-06-27 DIAGNOSIS — M25.532 LEFT WRIST PAIN: ICD-10-CM

## 2018-06-27 DIAGNOSIS — M79.89 SWELLING OF LEFT HAND: ICD-10-CM

## 2018-06-27 PROCEDURE — 97530 THERAPEUTIC ACTIVITIES: CPT | Mod: PO

## 2018-06-27 NOTE — PROGRESS NOTES
"OT PROGRESS NOTE AND DISCHARGE SUMMARY    DATE: 6/27/2018    Start Time:  11:00  Stop Time:  11:45  Visit:24/25, expires 6/27/18    PROCEDURES:    TIMED  Procedure Min.   TA 40                     UNTIMED  Procedure Min.   Paraffin 5         Total Timed Minutes:  40  Total Timed Units:  3  Total Untimed Units:  0  Charges Billed/# of units:  3TA      Progress/Current Status    Subjective:     Patient ID: Sarah Yee is a 33 y.o. female.  Diagnosis:   1. Left wrist pain     2. Swelling of left hand     3. Swelling of wrist joint, left       Pain: 0 /10  Pt states "it feels pretty good. I should be starting back at work soon."  Pt reports compliance with HEP.        Objective:       Pt seen by OT this session. Treatment consisted of the following:    Patient received paraffin bath to L hand(s) for 5 minutes to increase blood flow, circulation, pain management and for tissue elasticity prior to therex.   Pt received passive stretch to wrist and forearm as tolerated, and PROM x 10 reps each, and scar management with min vibratory scar massager x 5 min.        Date: 6/27/18    Visit: 24/25; expires 6/27/18   PROM to wrist and forearm, all planes 20 second hold   Wrist  AA/AROM (ext/flex/RD/UD), as tolerated 20 reps each   Pinky slides 10 reps   Supination/pronation with hammer stretch 10 reps   Digiflex, blue, 7# 2 x 10 reps   Digiextend, blue band 2 x 10 reps   PHG, 35# 2 x 10 reps   Flexbar, sup/pro, green 10 reps each   Wrist PRE, 3#, flex/ext/RD/UD 2 x 10  Reps each   theraputty - green, upgraded    -molding 2 min   -Gross gripping 10 reps   -log rolls with 3 point pinch 1 set   -lateral key pinches 10 reps   Clothespin, blue, 6#, for lateral key and 3 point pinches 10 reps each        Measurements taken 4/24/18, 5/14/18,  5/30/18, and 6/27/18: (improvement since eval and last r/a)     Supination/Pronation: 70 (+50) / 88 (+53)  Wrist flex/ext: 74 (+34) / 60 (+10)  RD/UD: 16 (+10) / 38 (+8)     strength in lbs " (position II):  R) 68  L) 51 (+11)    Pinch strength in psi (R/L):  Lateral key: 14.5 / 12 (+2)  3 point: 16.5 / 13 (+1.5)  2 point: 12 / 10 (+2.5)    Assessment:     Pt reassessed this date for discharge from therapy.  Pt demonstrates improved wrist and FA AROM,  strength, and pinch strength. Pt reports she will be returning to work soon. Pt continues to demonstrate good tolerance of tx. No pain or difficulty observed during session.  Pt denies pain. Cont c/o mild stiffness in wrist, but cont to stretch.   Pt met all goals. No further OT services recommended at this time.     Patient Education/Response:     Cont HEP, modalities, scar management,  hand hygiene, and orthosis wear.  Pt given green theraband at last visit.  Pt verbalized understanding of education and review of HEP.     Plans and Goals:     Discharge pt from skilled OT services at this time.     STGs (4 weeks):  1)  Initiate HEP, modalities, and fabricate orthosis---met  2) Increase ROM of fingers, wrist, and FA 5-10 degrees to increase functional hand use for ADLs/work/leisure activities---met  3)   Decrease edema .2-.3 mm to increase joint mobility /flexibility for functional hand use.---met  4)  Pt will report pain no higher than 4/10 at the worst during ADLs/IADLs---met      Goals: ( 8 weeks)   1)  Patient to be IND with HEP and modalities for pain management---met  2)  Pt will demonstrate full L composite fist in order to  items needed for daily activities---met  3)  Pt will demonstrate L wrist and FA AROM WFL in order to perform ADLs/IADLs---met  4)   and pinch to be assessed when appropriate, and goals set accordingly---met   Added: Pt will demonstrate increased  strength of at least 3-5# in order to better grasp items at work and for lifting---met               Pt will demonstrate increased pinch strength of at least 1-3 psi in order to open water bottles and for turning keys---met  5)  Pt will achieve FOTO score no higher than  35% impairment with L UE function---met        REHAB SERVICES OUTPATIENT DISCHARGE SUMMARY  Occupational Therapy      Name:  Sarah Yee  Date:  6/27/18  Date of Evaluation:  4/3/18  Physician:  Dr. Cesar Tejeda  Total # Of Visits:  24  Cancelled:  1  No Shows:  0  Diagnosis:  S/p ORIF of L DR alfonso  1. Left wrist pain     2. Swelling of left hand     3. Swelling of wrist joint, left         Physical/Functional Status:  At time of discharge, patient was able to demonstrate wrist and FA AROM WFL, improved  and pinch strength, improved ecema in wrist, and denied pain. Pt c/o occasional mild aching in wrist. She denies functional limitations at this time. See note for measurements    The patient is to be discharged from our Therapy service for the following reason(s):  Patient has completed the physician's prescription, Patient has met all of his/her goals and Patient has completed allowable visits authorized by insurance    Degree of Goal Achievement:  Patient has met all goals. See note    Patient Education:  Reviewed stretches, AROM, and hand and wrist strengthening as tolerated.     Discharge Plan:  Discharge form skilled OT services at this time.       TRENTON Benz

## 2018-11-21 ENCOUNTER — HOSPITAL ENCOUNTER (EMERGENCY)
Facility: HOSPITAL | Age: 33
Discharge: HOME | End: 2018-11-21
Attending: FAMILY MEDICINE
Payer: MEDICAID

## 2018-11-21 VITALS
TEMPERATURE: 99 F | BODY MASS INDEX: 36.32 KG/M2 | WEIGHT: 185 LBS | DIASTOLIC BLOOD PRESSURE: 78 MMHG | SYSTOLIC BLOOD PRESSURE: 118 MMHG | HEIGHT: 60 IN | HEART RATE: 102 BPM | RESPIRATION RATE: 22 BRPM | OXYGEN SATURATION: 99 %

## 2018-11-21 DIAGNOSIS — R05.9 COUGH: ICD-10-CM

## 2018-11-21 DIAGNOSIS — J18.9 PNEUMONIA OF RIGHT LUNG DUE TO INFECTIOUS ORGANISM, UNSPECIFIED PART OF LUNG: Primary | ICD-10-CM

## 2018-11-21 PROCEDURE — 94640 AIRWAY INHALATION TREATMENT: CPT

## 2018-11-21 PROCEDURE — 99284 EMERGENCY DEPT VISIT MOD MDM: CPT | Mod: 25

## 2018-11-21 PROCEDURE — 25000242 PHARM REV CODE 250 ALT 637 W/ HCPCS: Performed by: PHYSICIAN ASSISTANT

## 2018-11-21 RX ORDER — ALBUTEROL SULFATE 90 UG/1
1-2 AEROSOL, METERED RESPIRATORY (INHALATION) EVERY 6 HOURS PRN
Qty: 1 INHALER | Refills: 0 | Status: SHIPPED | OUTPATIENT
Start: 2018-11-21 | End: 2019-11-21

## 2018-11-21 RX ORDER — ALBUTEROL SULFATE 2.5 MG/.5ML
2.5 SOLUTION RESPIRATORY (INHALATION)
Status: COMPLETED | OUTPATIENT
Start: 2018-11-21 | End: 2018-11-21

## 2018-11-21 RX ORDER — ALBUTEROL SULFATE 2.5 MG/.5ML
2.5 SOLUTION RESPIRATORY (INHALATION) EVERY 4 HOURS PRN
Qty: 30 EACH | Refills: 0 | Status: SHIPPED | OUTPATIENT
Start: 2018-11-21 | End: 2021-12-30 | Stop reason: SDUPTHER

## 2018-11-21 RX ORDER — LEVOFLOXACIN 500 MG/1
750 TABLET, FILM COATED ORAL DAILY
Qty: 8 TABLET | Refills: 0 | Status: SHIPPED | OUTPATIENT
Start: 2018-11-21 | End: 2018-11-26

## 2018-11-21 RX ADMIN — ALBUTEROL SULFATE 2.5 MG: 2.5 SOLUTION RESPIRATORY (INHALATION) at 08:11

## 2018-11-22 NOTE — DISCHARGE INSTRUCTIONS
You are advised to follow up with her primary care provider for re-evaluation within 3 days and repeat chest x-ray within 2 weeks.  You are instructed to   Return to the emergency department immediately for any new or worsening symptoms.

## 2018-11-22 NOTE — ED PROVIDER NOTES
Encounter Date: 2018       History     Chief Complaint   Patient presents with    Cough     Patient has been having cough and chest congestion for 1.5 months. Does not have a PCP. Denies fever at home within the last 3 days. Patient stated she has a non productive cough.      33-year-old female presents to the emergency department for evaluation of 1.5 month history of nonproductive cough.  She reports that the cough began gradually 1.5 months ago and has been constant since onset.  She denies any other associated symptoms including fever, headache, nasal congestion, sinus pressure, ear pain, sore throat, chest pain, shortness of breath, abdominal pain, nausea, vomiting or dysuria.  She has attempted treatment with over-the-counter medications including Mucinex, DayQuil, NyQuil and Robitussin with no relief of symptoms.  Last dose of medication was several days ago.  She reports that her last menstrual period was 2018.  She denies risk of pregnancy stating that she has had a tubal ligation.          Review of patient's allergies indicates:  No Known Allergies  History reviewed. No pertinent past medical history.  Past Surgical History:   Procedure Laterality Date     SECTION      CHOLECYSTECTOMY      EYE SURGERY      LITHOTRIPSY      TUBAL LIGATION       Family History   Problem Relation Age of Onset    Hypertension Mother      Social History     Tobacco Use    Smoking status: Current Every Day Smoker     Packs/day: 0.50    Smokeless tobacco: Never Used   Substance Use Topics    Alcohol use: Yes     Comment: occasionally     Drug use: No     Review of Systems   Constitutional: Negative for activity change, appetite change and fever.   HENT: Negative for congestion, ear discharge, ear pain, postnasal drip, sinus pain, sore throat, trouble swallowing and voice change.    Eyes: Negative for photophobia and visual disturbance.   Respiratory: Positive for cough. Negative for chest tightness  and shortness of breath.    Cardiovascular: Negative for chest pain.   Gastrointestinal: Negative for abdominal pain, constipation, diarrhea, nausea and vomiting.   Genitourinary: Negative for decreased urine volume and dysuria.   Musculoskeletal: Negative for back pain, joint swelling and neck pain.   Skin: Negative for rash.   Neurological: Negative for dizziness, syncope, light-headedness, numbness and headaches.       Physical Exam     Initial Vitals [11/21/18 1946]   BP Pulse Resp Temp SpO2   118/78 93 20 99.1 °F (37.3 °C) 98 %      MAP       --         Physical Exam    Nursing note and vitals reviewed.  Constitutional: She appears well-developed and well-nourished. She is not diaphoretic. No distress.   HENT:   Head: Normocephalic and atraumatic.   Right Ear: External ear normal.   Left Ear: External ear normal.   Nose: Nose normal.   Mouth/Throat: Oropharynx is clear and moist.   Eyes: Conjunctivae and EOM are normal. Pupils are equal, round, and reactive to light.   Neck: Normal range of motion. Neck supple.   Cardiovascular: Normal rate, regular rhythm and normal heart sounds.   Pulmonary/Chest: No respiratory distress. She has wheezes (Scant expiratory wheezes noted to the upper lung fields bilaterally. No respiratory distress or accessory muscle use noted.). She has no rhonchi. She has no rales. She exhibits no tenderness.   Abdominal: Soft. Bowel sounds are normal. She exhibits no distension. There is no tenderness. There is no rebound.   Musculoskeletal: Normal range of motion.   Lymphadenopathy:     She has no cervical adenopathy.   Neurological: She is alert and oriented to person, place, and time.   Skin: Skin is warm and dry.   Psychiatric: She has a normal mood and affect.         ED Course   Procedures  Labs Reviewed - No data to display       Imaging Results    None          Medical Decision Making:   Initial Assessment:   [id number] presents for evaluation of cough.  Physical exam reveals  a nontoxic-appearing female in no acute distress. Patient is afebrile vital signs within normal limits.  Neurological exam reveals an alert and oriented patient.  TMs reveal no erythema.  Posterior pharynx reveals erythema, edema or tonsillar exudate. Neck is supple, no meningeal signs noted.  Auscultation the lungs reveals Scant expiratory wheezes noted to the upper lung fields bilaterally. No respiratory distress or accessory muscle use noted. Abdominal exam reveals a soft abdomen, nontender to palpation. No CVA tenderness noted.  Differential Diagnosis:   Chest x-ray ordered to assess possible pneumonia or consolidation.    ED Management:  Patient was given albuterol with complete relief of wheezing.  Upon re-evaluation lungs clear to auscultation bilaterally. No respiratory distress or accessory muscle use noted.  Patient declined UPT stating she is not pregnant.  Chest x-ray reveals right infrahilar atelectasis versus infiltrate.  Discussed these findings at length with the patient verbalizes understanding and agreement course of treatment.  Instructed the patient to follow up with her primary care provider for re-evaluation and repeat chest x-ray.  Instructed the patient to return to the emergency department immediately for any new or worsening symptoms. I discussed this patient at length with Dr. James who is in agreement with the course of treatment.                      Clinical Impression:   The primary encounter diagnosis was Pneumonia of right lung due to infectious organism, unspecified part of lung. A diagnosis of Cough was also pertinent to this visit.                             Marielena Zelaya PA-C  11/21/18 2052       Marielena Zelaya PA-C  11/21/18 [id number]

## 2020-05-08 ENCOUNTER — HOSPITAL ENCOUNTER (EMERGENCY)
Facility: HOSPITAL | Age: 35
Discharge: HOME | End: 2020-05-08
Attending: FAMILY MEDICINE
Payer: MEDICAID

## 2020-05-08 VITALS
SYSTOLIC BLOOD PRESSURE: 120 MMHG | BODY MASS INDEX: 39.27 KG/M2 | HEIGHT: 60 IN | HEART RATE: 102 BPM | RESPIRATION RATE: 18 BRPM | OXYGEN SATURATION: 97 % | WEIGHT: 200 LBS | TEMPERATURE: 100 F | DIASTOLIC BLOOD PRESSURE: 74 MMHG

## 2020-05-08 DIAGNOSIS — T63.461A WASP STING, ACCIDENTAL OR UNINTENTIONAL, INITIAL ENCOUNTER: Primary | ICD-10-CM

## 2020-05-08 LAB — SARS-COV-2 RDRP RESP QL NAA+PROBE: NEGATIVE

## 2020-05-08 PROCEDURE — 63600175 PHARM REV CODE 636 W HCPCS: Mod: ER | Performed by: PHYSICIAN ASSISTANT

## 2020-05-08 PROCEDURE — U0002 COVID-19 LAB TEST NON-CDC: HCPCS | Mod: ER

## 2020-05-08 PROCEDURE — 96372 THER/PROPH/DIAG INJ SC/IM: CPT | Mod: ER

## 2020-05-08 PROCEDURE — 25000003 PHARM REV CODE 250: Mod: ER | Performed by: PHYSICIAN ASSISTANT

## 2020-05-08 PROCEDURE — 99284 EMERGENCY DEPT VISIT MOD MDM: CPT | Mod: 25,ER

## 2020-05-08 RX ORDER — DIPHENHYDRAMINE HCL 25 MG
25 CAPSULE ORAL
Status: COMPLETED | OUTPATIENT
Start: 2020-05-08 | End: 2020-05-08

## 2020-05-08 RX ORDER — DEXAMETHASONE SODIUM PHOSPHATE 4 MG/ML
8 INJECTION, SOLUTION INTRA-ARTICULAR; INTRALESIONAL; INTRAMUSCULAR; INTRAVENOUS; SOFT TISSUE
Status: COMPLETED | OUTPATIENT
Start: 2020-05-08 | End: 2020-05-08

## 2020-05-08 RX ORDER — FAMOTIDINE 20 MG/1
20 TABLET, FILM COATED ORAL
Status: COMPLETED | OUTPATIENT
Start: 2020-05-08 | End: 2020-05-08

## 2020-05-08 RX ADMIN — DIPHENHYDRAMINE HYDROCHLORIDE 25 MG: 25 CAPSULE ORAL at 03:05

## 2020-05-08 RX ADMIN — FAMOTIDINE 20 MG: 20 TABLET ORAL at 03:05

## 2020-05-08 RX ADMIN — DEXAMETHASONE SODIUM PHOSPHATE 8 MG: 4 INJECTION, SOLUTION INTRAMUSCULAR; INTRAVENOUS at 03:05

## 2020-05-08 NOTE — ED PROVIDER NOTES
Encounter Date: 2020       History     Chief Complaint   Patient presents with    Insect Bite     Reports bee sting to right hand about 10 mins pta. Pt states she is allergic. Pt has not had any benadryl. Redness and swelling noted to right middle finger.     Patient presents with complaint of wasp sting to the right middle finger and redness and swelling isolated to that finger. No facial swelling or shortness of breath. She reports history of anaphylaxis to a sting in the past, but does not know if it was a bee or wasp. No treatment today prior to arrival. She is febrile on arrival.         Review of patient's allergies indicates:  No Known Allergies  History reviewed. No pertinent past medical history.  Past Surgical History:   Procedure Laterality Date     SECTION      CHOLECYSTECTOMY      EYE SURGERY      LITHOTRIPSY      TUBAL LIGATION       Family History   Problem Relation Age of Onset    Hypertension Mother      Social History     Tobacco Use    Smoking status: Current Every Day Smoker     Packs/day: 0.50    Smokeless tobacco: Never Used   Substance Use Topics    Alcohol use: Yes     Comment: occasionally     Drug use: No     Review of Systems   Constitutional: Negative for activity change, appetite change, chills and fever.   HENT: Negative for facial swelling, trouble swallowing and voice change.    Respiratory: Negative for cough, shortness of breath and wheezing.    Cardiovascular: Negative for chest pain, palpitations and leg swelling.   Musculoskeletal: Negative for joint swelling, neck pain and neck stiffness.   Skin: Positive for wound.   Neurological: Negative for dizziness and headaches.   All other systems reviewed and are negative.      Physical Exam     Initial Vitals [20 1456]   BP Pulse Resp Temp SpO2   120/74 102 18 100.1 °F (37.8 °C) 97 %      MAP       --         Physical Exam    Nursing note and vitals reviewed.  Constitutional: She appears well-developed and  well-nourished. She appears distressed.   HENT:   Head: Normocephalic and atraumatic.   Nose: Nose normal.   Mouth/Throat: Oropharynx is clear and moist.   Eyes: Conjunctivae and EOM are normal. Pupils are equal, round, and reactive to light.   Neck: Normal range of motion. Neck supple.   Cardiovascular: Normal rate, regular rhythm, normal heart sounds and intact distal pulses.   Pulmonary/Chest: Breath sounds normal. No respiratory distress.   Musculoskeletal:   Normal ROM all joints in the right hand   Lymphadenopathy:     She has no cervical adenopathy.   Neurological: She is alert and oriented to person, place, and time.   Skin: Skin is warm and dry.   There is 2 cm erythema slight swelling on the dorsal aspect of the right middle finger consistent with localized sting reaction. No joint swelling.    Psychiatric: She has a normal mood and affect. Her behavior is normal. Judgment and thought content normal.         ED Course   Procedures  Labs Reviewed   SARS-COV-2 RNA AMPLIFICATION, QUAL    Narrative:     What symptom criteria does the patient meet?->Fever          Imaging Results    None          Medical Decision Making:   Clinical Tests:   Lab Tests: Ordered and Reviewed       <> Summary of Lab: Rapid Covid 19 negative  Patient was observed over 1 hour and symptoms were improving. Advised her to take benadryl every 4 -6 hours prn. Return to the ED if worse in any way.                                  Clinical Impression:       ICD-10-CM ICD-9-CM   1. Wasp sting, accidental or unintentional, initial encounter T63.461A 989.5     E905.3         Disposition:   Disposition: Discharged     ED Disposition Condition    Discharge Stable        ED Prescriptions     None        Follow-up Information     Follow up With Specialties Details Why Contact Info    Jl Morales MD Family Medicine   [facility] [address] [place] [address]  [phone]                                       MONICA Mcintyre  05/08/20  [id number]

## 2020-12-13 ENCOUNTER — HOSPITAL ENCOUNTER (EMERGENCY)
Facility: HOSPITAL | Age: 35
Discharge: HOME | End: 2020-12-13
Attending: EMERGENCY MEDICINE
Payer: MEDICAID

## 2020-12-13 VITALS
BODY MASS INDEX: 39.27 KG/M2 | TEMPERATURE: 99 F | WEIGHT: 200 LBS | DIASTOLIC BLOOD PRESSURE: 72 MMHG | SYSTOLIC BLOOD PRESSURE: 142 MMHG | OXYGEN SATURATION: 100 % | HEART RATE: 88 BPM | HEIGHT: 60 IN

## 2020-12-13 DIAGNOSIS — J06.9 VIRAL URI: Primary | ICD-10-CM

## 2020-12-13 LAB
GROUP A STREP, MOLECULAR: NEGATIVE
SARS-COV-2 RDRP RESP QL NAA+PROBE: NEGATIVE

## 2020-12-13 PROCEDURE — 87651 STREP A DNA AMP PROBE: CPT | Mod: ER

## 2020-12-13 PROCEDURE — U0002 COVID-19 LAB TEST NON-CDC: HCPCS | Mod: ER

## 2020-12-13 PROCEDURE — 99282 EMERGENCY DEPT VISIT SF MDM: CPT | Mod: ER

## 2020-12-14 NOTE — ED PROVIDER NOTES
"Encounter Date: 2020       History     Chief Complaint   Patient presents with    Sore Throat     Pt to er with c/o sorethroat x 2 days and not has "popping of the ears." PT light cough with mucus. Denies fever. Pt took Ibuprofen yesterday.      Patient currently presents with concern regarding upper respiratory symptoms.  She notes 2 day history of sore throat and ear congestion as well as sinus drip.  Patient did take Ibuprofen yesterday.  Denies vomiting or diarrhea.  Denies anosmia or ageusia.        Review of patient's allergies indicates:  No Known Allergies  History reviewed. No pertinent past medical history.  Past Surgical History:   Procedure Laterality Date     SECTION      CHOLECYSTECTOMY      EYE MUSCLE SURGERY      EYE SURGERY      LITHOTRIPSY      TUBAL LIGATION      Wrist sx      Left hand     Family History   Problem Relation Age of Onset    Hypertension Mother      Social History     Tobacco Use    Smoking status: Current Every Day Smoker     Packs/day: 0.50     Types: Cigarettes    Smokeless tobacco: Never Used   Substance Use Topics    Alcohol use: Yes     Comment: occasionally     Drug use: No     Review of Systems   Constitutional: Negative for chills and fever.   HENT: Positive for congestion, postnasal drip, rhinorrhea and sore throat.    Respiratory: Positive for cough. Negative for chest tightness, shortness of breath and wheezing.    Cardiovascular: Negative for chest pain, palpitations and leg swelling.   Gastrointestinal: Negative for abdominal pain, constipation, diarrhea, nausea and vomiting.   Genitourinary: Negative for dysuria, frequency, urgency, vaginal bleeding and vaginal discharge.   Skin: Negative for color change and rash.   Allergic/Immunologic: Negative for immunocompromised state.   Neurological: Negative for dizziness, weakness and numbness.   Hematological: Negative for adenopathy. Does not bruise/bleed easily.   All other systems reviewed and " are negative.      Physical Exam     Initial Vitals   BP Pulse Resp Temp SpO2   12/13/20 2304 12/13/20 2304 -- 12/13/20 2259 12/13/20 2304   (!) 142/72 88  98.9 °F (37.2 °C) 100 %      MAP       --                  Physical Exam    Nursing note and vitals reviewed.  Constitutional: She appears well-developed and well-nourished. She is not diaphoretic. No distress.   HENT:   Head: Normocephalic and atraumatic.   Nose: Rhinorrhea present.   Cardiovascular: Normal rate, regular rhythm, normal heart sounds and intact distal pulses.   Pulmonary/Chest: Breath sounds normal. No respiratory distress.   Neurological: She is alert and oriented to person, place, and time.   Skin: Skin is warm and dry.         ED Course   Procedures  Labs Reviewed   GROUP A STREP, MOLECULAR   SARS-COV-2 RNA AMPLIFICATION, QUAL          Imaging Results    None          Medical Decision Making:   ED Management:  All findings were reviewed with the patient/family in detail along with the diagnosis of URI.  Patient/family has been advised to use an appropriate antihistamine and expectorant/antitussive agents for symptomatic relief pending resolution and PCP follow-up.  I see no indication of an emergent process beyond that addressed during our encounter but have duly counseled the patient/family regarding the need for prompt follow-up as well as the indications that should prompt immediate return to the emergency room should new or worrisome developments occur.  The patient/family communicates understanding of all this information and all remaining questions and concerns were addressed at this time.                                     Clinical Impression:     ICD-10-CM ICD-9-CM   1. Viral URI  J06.9 465.9                          ED Disposition Condition    Discharge Stable        ED Prescriptions     None        Follow-up Information     Follow up With Specialties Details Why Contact Info    Jl Morales MD Family Medicine Schedule an  appointment as soon as possible for a visit  for reassessment [address] [place] [address]  [phone]                                         Joaquin Floyd MD  12/14/20 0567

## 2021-04-25 ENCOUNTER — HOSPITAL ENCOUNTER (EMERGENCY)
Facility: HOSPITAL | Age: 36
Discharge: HOME | End: 2021-04-25
Attending: EMERGENCY MEDICINE
Payer: MEDICAID

## 2021-04-25 VITALS
OXYGEN SATURATION: 98 % | WEIGHT: 200 LBS | TEMPERATURE: 100 F | RESPIRATION RATE: 18 BRPM | DIASTOLIC BLOOD PRESSURE: 70 MMHG | HEART RATE: 88 BPM | HEIGHT: 60 IN | BODY MASS INDEX: 39.27 KG/M2 | SYSTOLIC BLOOD PRESSURE: 129 MMHG

## 2021-04-25 DIAGNOSIS — J06.9 VIRAL URI WITH COUGH: Primary | ICD-10-CM

## 2021-04-25 LAB
B-HCG UR QL: NEGATIVE
SARS-COV-2 RDRP RESP QL NAA+PROBE: NEGATIVE

## 2021-04-25 PROCEDURE — 81025 URINE PREGNANCY TEST: CPT | Mod: ER | Performed by: EMERGENCY MEDICINE

## 2021-04-25 PROCEDURE — U0002 COVID-19 LAB TEST NON-CDC: HCPCS | Mod: ER | Performed by: EMERGENCY MEDICINE

## 2021-04-25 PROCEDURE — 99284 EMERGENCY DEPT VISIT MOD MDM: CPT | Mod: ER

## 2021-04-25 RX ORDER — FLUTICASONE PROPIONATE 50 MCG
1 SPRAY, SUSPENSION (ML) NASAL 2 TIMES DAILY PRN
Qty: 15 G | Refills: 0 | Status: SHIPPED | OUTPATIENT
Start: 2021-04-25

## 2021-04-25 RX ORDER — BENZONATATE 100 MG/1
200 CAPSULE ORAL 3 TIMES DAILY PRN
Qty: 20 CAPSULE | Refills: 0 | Status: SHIPPED | OUTPATIENT
Start: 2021-04-25 | End: 2021-05-05

## 2021-04-25 RX ORDER — CETIRIZINE HYDROCHLORIDE 10 MG/1
10 TABLET ORAL DAILY
Qty: 30 TABLET | Refills: 0 | COMMUNITY
Start: 2021-04-25 | End: 2022-04-25

## 2021-12-30 ENCOUNTER — HOSPITAL ENCOUNTER (EMERGENCY)
Facility: HOSPITAL | Age: 36
Discharge: HOME | End: 2021-12-30
Attending: EMERGENCY MEDICINE
Payer: MEDICAID

## 2021-12-30 VITALS
TEMPERATURE: 99 F | DIASTOLIC BLOOD PRESSURE: 78 MMHG | SYSTOLIC BLOOD PRESSURE: 151 MMHG | OXYGEN SATURATION: 98 % | RESPIRATION RATE: 18 BRPM | BODY MASS INDEX: 40.05 KG/M2 | WEIGHT: 204 LBS | HEART RATE: 91 BPM | HEIGHT: 60 IN

## 2021-12-30 DIAGNOSIS — J06.9 VIRAL URI WITH COUGH: ICD-10-CM

## 2021-12-30 DIAGNOSIS — Z20.822 EXPOSURE TO COVID-19 VIRUS: Primary | ICD-10-CM

## 2021-12-30 PROCEDURE — U0003 INFECTIOUS AGENT DETECTION BY NUCLEIC ACID (DNA OR RNA); SEVERE ACUTE RESPIRATORY SYNDROME CORONAVIRUS 2 (SARS-COV-2) (CORONAVIRUS DISEASE [COVID-19]), AMPLIFIED PROBE TECHNIQUE, MAKING USE OF HIGH THROUGHPUT TECHNOLOGIES AS DESCRIBED BY CMS-2020-01-R: HCPCS | Mod: ER | Performed by: PHYSICIAN ASSISTANT

## 2021-12-30 PROCEDURE — 99284 EMERGENCY DEPT VISIT MOD MDM: CPT | Mod: 25,ER

## 2021-12-30 RX ORDER — BENZONATATE 100 MG/1
100 CAPSULE ORAL 3 TIMES DAILY PRN
Qty: 21 CAPSULE | Refills: 0 | Status: SHIPPED | OUTPATIENT
Start: 2021-12-30 | End: 2022-01-09

## 2021-12-30 RX ORDER — ALBUTEROL SULFATE 2.5 MG/.5ML
2.5 SOLUTION RESPIRATORY (INHALATION) EVERY 4 HOURS PRN
Qty: 30 EACH | Refills: 0 | Status: SHIPPED | OUTPATIENT
Start: 2021-12-30 | End: 2022-12-30

## 2021-12-30 NOTE — Clinical Note
"Sarah "Lara Yee was seen and treated in our emergency department on 12/30/2021.     COVID-19 is present in our communities across the state. There is limited testing for COVID at this time, so not all patients can be tested. In this situation, your employee meets the following criteria:    Sarah Yee has met the criteria for COVID-19 testing based upon symptoms, travel, and/or potential exposure. The test has been completed and is pending results at this time. During this time the employee is not able to work and should be quarantined per the Centers for Disease Control timelines.     If you have any questions or concerns, or if I can be of further assistance, please do not hesitate to contact me.    Sincerely,             MONICA Mcintyre"

## 2021-12-31 NOTE — ED PROVIDER NOTES
Encounter Date: 2021       History     Chief Complaint   Patient presents with    Cough     Productive cough x 1 week      Patient is a 36-year-old female with 5 day history of cough productive of clear sputum.  No chest pain or shortness of breath.  She has had coworkers test positive for COVID.  No change in sense of smell or taste.        Review of patient's allergies indicates:  No Known Allergies  History reviewed. No pertinent past medical history.  Past Surgical History:   Procedure Laterality Date     SECTION      CHOLECYSTECTOMY      EYE MUSCLE SURGERY      EYE SURGERY      LITHOTRIPSY      TUBAL LIGATION      Wrist sx      Left hand     Family History   Problem Relation Age of Onset    Hypertension Mother      Social History     Tobacco Use    Smoking status: Current Every Day Smoker     Packs/day: 0.50     Types: Cigarettes    Smokeless tobacco: Never Used   Substance Use Topics    Alcohol use: Yes     Comment: occasionally     Drug use: No     Review of Systems   Constitutional: Negative for activity change, appetite change, chills, fatigue and fever.   HENT: Negative for congestion, ear pain, rhinorrhea, sore throat and voice change.    Respiratory: Positive for cough. Negative for shortness of breath and wheezing.    Cardiovascular: Negative for chest pain.   Gastrointestinal: Negative for abdominal pain, diarrhea and vomiting.   Musculoskeletal: Negative for neck pain and neck stiffness.   Skin: Negative for rash.   Neurological: Negative for dizziness and headaches.   All other systems reviewed and are negative.      Physical Exam     Initial Vitals [21]   BP Pulse Resp Temp SpO2   (!) 151/78 91 18 99 °F (37.2 °C) 98 %      MAP       --         Physical Exam    Nursing note and vitals reviewed.  Constitutional: She appears well-developed and well-nourished. She appears distressed.   HENT:   Head: Normocephalic and atraumatic.   Nose: Nose normal.   Eyes:  Conjunctivae are normal.   Neck:   Normal range of motion.  Cardiovascular: Normal rate.   Pulmonary/Chest: No respiratory distress.   Musculoskeletal:      Cervical back: Normal range of motion.     Neurological: She is alert and oriented to person, place, and time.   Skin: No rash noted.   Psychiatric: She has a normal mood and affect. Her behavior is normal. Judgment and thought content normal.         ED Course   Procedures  Labs Reviewed   SARS-COV-2 (COVID-19) QUALITATIVE PCR          Imaging Results    None          Medications - No data to display  Medical Decision Making:   COVID PCR pending.  Advised on quarantine and supportive care.  Return to the emergency department if worse in any way                      Clinical Impression:   Final diagnoses:  [J06.9] Viral URI with cough  [Z20.822] Exposure to COVID-19 virus (Primary)          ED Disposition Condition    Discharge Stable        ED Prescriptions     Medication Sig Dispense Start Date End Date Auth. Provider    albuterol sulfate 2.5 mg/0.5 mL Nebu Take 2.5 mg by nebulization every 4 (four) hours as needed. Rescue 30 each 12/30/2021 12/30/2022 MONICA Mcintyre    benzonatate (TESSALON) 100 MG capsule Take 1 capsule (100 mg total) by mouth 3 (three) times daily as needed for Cough. 21 capsule 12/30/2021 1/9/2022 MONICA Mcintyre        Follow-up Information     Follow up With Specialties Details Why Contact Info    Jl Morales MD Family Medicine In 2 days  [address] [place] [address]  [phone]             MONICA Mcintyre  12/30/21 7304

## 2021-12-31 NOTE — DISCHARGE INSTRUCTIONS
Quarantine until you receive your results.  Return to the emergency department for chest pain, shortness of breath or if worse in any way

## 2022-01-03 LAB
SARS-COV-2 RNA RESP QL NAA+PROBE: NOT DETECTED
SARS-COV-2- CYCLE NUMBER: NORMAL

## 2023-03-10 ENCOUNTER — OUTSIDE PLACE OF SERVICE (OUTPATIENT)
Dept: CARDIOLOGY | Facility: CLINIC | Age: 38
End: 2023-03-10
Payer: MEDICAID

## 2023-03-10 PROCEDURE — 93010 PR ELECTROCARDIOGRAM REPORT: ICD-10-PCS | Mod: ,,, | Performed by: INTERNAL MEDICINE

## 2023-03-10 PROCEDURE — 93010 ELECTROCARDIOGRAM REPORT: CPT | Mod: ,,, | Performed by: INTERNAL MEDICINE

## 2023-09-24 ENCOUNTER — HOSPITAL ENCOUNTER (EMERGENCY)
Facility: HOSPITAL | Age: 38
Discharge: HOME | End: 2023-09-24
Attending: FAMILY MEDICINE
Payer: MEDICAID

## 2023-09-24 VITALS
HEART RATE: 84 BPM | WEIGHT: 208 LBS | TEMPERATURE: 99 F | OXYGEN SATURATION: 100 % | DIASTOLIC BLOOD PRESSURE: 67 MMHG | RESPIRATION RATE: 17 BRPM | SYSTOLIC BLOOD PRESSURE: 135 MMHG | BODY MASS INDEX: 40.62 KG/M2

## 2023-09-24 DIAGNOSIS — R10.13 EPIGASTRIC PAIN: Primary | ICD-10-CM

## 2023-09-24 DIAGNOSIS — R19.06 EPIGASTRIC SWELLING OR MASS OR LUMP: ICD-10-CM

## 2023-09-24 LAB
ALBUMIN SERPL BCP-MCNC: 3.7 G/DL (ref 3.5–5.2)
ALP SERPL-CCNC: 73 U/L (ref 38–126)
ALT SERPL W/O P-5'-P-CCNC: 23 U/L (ref 10–44)
ANION GAP SERPL CALC-SCNC: 8 MMOL/L (ref 8–16)
AST SERPL-CCNC: 24 U/L (ref 15–46)
BASOPHILS # BLD AUTO: 0.06 K/UL (ref 0–0.2)
BASOPHILS NFR BLD: 0.6 % (ref 0–1.9)
BILIRUB SERPL-MCNC: 0.3 MG/DL (ref 0.1–1)
BILIRUB UR QL STRIP: NEGATIVE
CALCIUM SERPL-MCNC: 8.8 MG/DL (ref 8.7–10.5)
CHLORIDE SERPL-SCNC: 108 MMOL/L (ref 95–110)
CLARITY UR REFRACT.AUTO: ABNORMAL
CO2 SERPL-SCNC: 22 MMOL/L (ref 23–29)
COLOR UR AUTO: YELLOW
CREAT SERPL-MCNC: 0.77 MG/DL (ref 0.5–1.4)
DIFFERENTIAL METHOD: ABNORMAL
EOSINOPHIL # BLD AUTO: 0.2 K/UL (ref 0–0.5)
EOSINOPHIL NFR BLD: 1.9 % (ref 0–8)
ERYTHROCYTE [DISTWIDTH] IN BLOOD BY AUTOMATED COUNT: 12.6 % (ref 11.5–14.5)
EST. GFR  (NO RACE VARIABLE): >60 ML/MIN/1.73 M^2
GLUCOSE SERPL-MCNC: 96 MG/DL (ref 70–110)
GLUCOSE UR QL STRIP: NEGATIVE
HCT VFR BLD AUTO: 34.4 % (ref 37–48.5)
HGB BLD-MCNC: 11.5 G/DL (ref 12–16)
HGB UR QL STRIP: NEGATIVE
IMM GRANULOCYTES # BLD AUTO: 0.03 K/UL (ref 0–0.04)
IMM GRANULOCYTES NFR BLD AUTO: 0.3 % (ref 0–0.5)
KETONES UR QL STRIP: NEGATIVE
LEUKOCYTE ESTERASE UR QL STRIP: NEGATIVE
LIPASE SERPL-CCNC: 63 U/L (ref 23–300)
LYMPHOCYTES # BLD AUTO: 2.4 K/UL (ref 1–4.8)
LYMPHOCYTES NFR BLD: 25.6 % (ref 18–48)
MCH RBC QN AUTO: 29.9 PG (ref 27–31)
MCHC RBC AUTO-ENTMCNC: 33.4 G/DL (ref 32–36)
MCV RBC AUTO: 89 FL (ref 82–98)
MONOCYTES # BLD AUTO: 0.7 K/UL (ref 0.3–1)
MONOCYTES NFR BLD: 7.8 % (ref 4–15)
NEUTROPHILS # BLD AUTO: 6.1 K/UL (ref 1.8–7.7)
NEUTROPHILS NFR BLD: 63.8 % (ref 38–73)
NITRITE UR QL STRIP: NEGATIVE
NRBC BLD-RTO: 0 /100 WBC
PH UR STRIP: 7 [PH] (ref 5–8)
PLATELET # BLD AUTO: 242 K/UL (ref 150–450)
PMV BLD AUTO: 11.3 FL (ref 9.2–12.9)
POTASSIUM SERPL-SCNC: 4 MMOL/L (ref 3.5–5.1)
PROT SERPL-MCNC: 7.3 G/DL (ref 6–8.4)
PROT UR QL STRIP: NEGATIVE
RBC # BLD AUTO: 3.85 M/UL (ref 4–5.4)
SODIUM SERPL-SCNC: 138 MMOL/L (ref 136–145)
SP GR UR STRIP: 1.02 (ref 1–1.03)
URN SPEC COLLECT METH UR: ABNORMAL
UROBILINOGEN UR STRIP-ACNC: NEGATIVE EU/DL
UUN UR-MCNC: 13 MG/DL (ref 7–17)
WBC # BLD AUTO: 9.54 K/UL (ref 3.9–12.7)

## 2023-09-24 PROCEDURE — 81003 URINALYSIS AUTO W/O SCOPE: CPT | Mod: ER | Performed by: PHYSICIAN ASSISTANT

## 2023-09-24 PROCEDURE — 83690 ASSAY OF LIPASE: CPT | Mod: ER | Performed by: PHYSICIAN ASSISTANT

## 2023-09-24 PROCEDURE — 80053 COMPREHEN METABOLIC PANEL: CPT | Mod: ER | Performed by: PHYSICIAN ASSISTANT

## 2023-09-24 PROCEDURE — 25000003 PHARM REV CODE 250: Mod: ER | Performed by: PHYSICIAN ASSISTANT

## 2023-09-24 PROCEDURE — 63600175 PHARM REV CODE 636 W HCPCS: Mod: ER | Performed by: PHYSICIAN ASSISTANT

## 2023-09-24 PROCEDURE — 96361 HYDRATE IV INFUSION ADD-ON: CPT | Mod: ER

## 2023-09-24 PROCEDURE — 85025 COMPLETE CBC W/AUTO DIFF WBC: CPT | Mod: ER | Performed by: PHYSICIAN ASSISTANT

## 2023-09-24 PROCEDURE — 99285 EMERGENCY DEPT VISIT HI MDM: CPT | Mod: 25,ER

## 2023-09-24 PROCEDURE — 96375 TX/PRO/DX INJ NEW DRUG ADDON: CPT | Mod: ER

## 2023-09-24 PROCEDURE — 25500020 PHARM REV CODE 255: Mod: ER | Performed by: FAMILY MEDICINE

## 2023-09-24 PROCEDURE — 96374 THER/PROPH/DIAG INJ IV PUSH: CPT | Mod: 59,ER

## 2023-09-24 RX ORDER — ONDANSETRON 2 MG/ML
4 INJECTION INTRAMUSCULAR; INTRAVENOUS
Status: COMPLETED | OUTPATIENT
Start: 2023-09-24 | End: 2023-09-24

## 2023-09-24 RX ORDER — MORPHINE SULFATE 4 MG/ML
4 INJECTION, SOLUTION INTRAMUSCULAR; INTRAVENOUS
Status: COMPLETED | OUTPATIENT
Start: 2023-09-24 | End: 2023-09-24

## 2023-09-24 RX ADMIN — IOHEXOL 100 ML: 350 INJECTION, SOLUTION INTRAVENOUS at 05:09

## 2023-09-24 RX ADMIN — ONDANSETRON 4 MG: 2 INJECTION INTRAMUSCULAR; INTRAVENOUS at 04:09

## 2023-09-24 RX ADMIN — MORPHINE SULFATE 4 MG: 4 INJECTION INTRAVENOUS at 04:09

## 2023-09-24 RX ADMIN — SODIUM CHLORIDE 1000 ML: 9 INJECTION, SOLUTION INTRAVENOUS at 04:09

## 2023-09-24 NOTE — ED PROVIDER NOTES
"Encounter Date: 2023       History     Chief Complaint   Patient presents with    Abdominal Pain     Sent from  for palpable mass to epigastric area x 2 weeks.      This is a 38-year-old white female that presents to ED after being sent over from urgent care due to a palpable mass noted to the epigastric region of the abdomen with associated pain.  She states she is had on again off again pain in the epigastric region of the abdomen for roughly the last 2-3 years but was treated for acid reflux which did temporarily helped.  She states over the last 2 weeks she began to have pain in the area and began to notice and feel a "knot".  She rates her pain currently 7/10 and describes it as a pressure.  The pain intermittently radiates into the right upper quadrant of her abdomen.  She is been taking Toradol previously prescribed for this which helps her sleep at night but otherwise does not help with the pain.  Palpation of the area makes the pain worse.  She states she is still having bowel movements and passing gas.  She states she has intermittent nausea but no vomiting.      Review of patient's allergies indicates:  No Known Allergies  History reviewed. No pertinent past medical history.  Past Surgical History:   Procedure Laterality Date     SECTION      CHOLECYSTECTOMY      EYE MUSCLE SURGERY      EYE SURGERY      LITHOTRIPSY      TUBAL LIGATION      Wrist sx      Left hand     Family History   Problem Relation Age of Onset    Hypertension Mother      Social History     Tobacco Use    Smoking status: Every Day     Current packs/day: 0.50     Types: Cigarettes    Smokeless tobacco: Never   Substance Use Topics    Alcohol use: Yes     Comment: occasionally     Drug use: No     Review of Systems   Constitutional:  Negative for fever.   Respiratory:  Negative for cough and shortness of breath.    Cardiovascular:  Negative for chest pain and palpitations.   Gastrointestinal:  Positive for abdominal pain " and nausea. Negative for vomiting.   Genitourinary:  Negative for dysuria, flank pain, frequency and urgency.   Psychiatric/Behavioral:  Negative for agitation and behavioral problems.        Physical Exam     Initial Vitals [09/24/23 1512]   BP Pulse Resp Temp SpO2   135/69 75 18 99 °F (37.2 °C) 98 %      MAP       --         Physical Exam    Constitutional: She appears well-developed and well-nourished.   HENT:   Head: Normocephalic and atraumatic.   Cardiovascular:  Normal rate, regular rhythm and normal heart sounds.           Pulmonary/Chest: Breath sounds normal. She has no wheezes.   Abdominal: Abdomen is soft and flat. Bowel sounds are normal. There is abdominal tenderness in the epigastric area. A hernia is present.   Palpable, tender mass noted to the epigastric region of the abdomen.  There is no erythema, warmth, or other skin changes noted. There is guarding.           ED Course   Procedures  Labs Reviewed   CBC W/ AUTO DIFFERENTIAL - Abnormal; Notable for the following components:       Result Value    RBC 3.85 (*)     Hemoglobin 11.5 (*)     Hematocrit 34.4 (*)     All other components within normal limits   COMPREHENSIVE METABOLIC PANEL - Abnormal; Notable for the following components:    CO2 22 (*)     All other components within normal limits   URINALYSIS, REFLEX TO URINE CULTURE - Abnormal; Notable for the following components:    Appearance, UA Hazy (*)     All other components within normal limits    Narrative:     Preferred Collection Type->Urine, Clean Catch  Specimen Source->Urine   LIPASE          Imaging Results              CT Abdomen Pelvis With Contrast (Final result)  Result time 09/24/23 17:26:10      Final result by Candice Shelby MD (09/24/23 17:26:10)                   Impression:      No acute finding; small fat containing infraumbilical hernia      Electronically signed by: Candice Shelby  Date:    09/24/2023  Time:    17:26               Narrative:    EXAMINATION:  CT  ABDOMEN PELVIS WITH CONTRAST    CLINICAL HISTORY:  Epigastric pain;Palpable mass concerning for hernia;    TECHNIQUE:  Low dose axial images, sagittal and coronal reformations were obtained from the lung bases to the pubic symphysis following the IV administration of 100 mL of Omnipaque 350 and the oral administration of 30 mL of Omnipaque 350.    COMPARISON:  None.    FINDINGS:  Liver appears mildly fatty.  Gallbladder absent    Spleen normal size    Pancreas unremarkable    Kidneys without hydronephrosis.  Punctate calyceal nephrolithiasis possible    Urinary bladder unremarkable    No obstructive or inflammatory bowel findings.  Appendix normal caliber    Very small fat containing ventral abdominal wall hernia infraumbilical without overt complication    Small round 15 mm right breast density nonspecific                                       Medications   sodium chloride 0.9% bolus 1,000 mL 1,000 mL (0 mLs Intravenous Stopped 9/24/23 1724)   morphine injection 4 mg (4 mg Intravenous Given 9/24/23 1607)   ondansetron injection 4 mg (4 mg Intravenous Given 9/24/23 1607)   iohexoL (OMNIPAQUE 350) injection 100 mL (100 mLs Intravenous Given 9/24/23 1711)     Medical Decision Making  This is a 38-year-old white female presents the ED from urgent care for further evaluation to epigastric abdominal pain, epigastric palpable mass with tenderness to palpation.  She states that it has been getting progressively worse over the last 2 weeks.  On arrival the patient is afebrile and nontoxic-appearing with stable vital signs.  She is in no acute distress differential diagnoses include but are not limited to ventral hernia, pancreatitis, GERD, cholelithiasis.  Please see physical exam for further details.  The patient was given 1 L of IV fluids along with IV morphine and Zofran and her pain/nausea resolved.  CBC shows no leukocytosis.  CMP is unremarkable.  UA shows no infection.  Lipase is normal.  CT of the abdomen and pelvis  with contrast shows no acute finding and only a small fat containing infra umbilical hernia.  I discussed this patient with my attending Dr. Hess he feels this is likely a ventral wall hernia that is not emergent, incarcerated, or strangulated despite no CT findings.  I do not suspect any emergent or life-threatening intra-abdominal disease process or condition.  The patient will be referred to General surgery for further evaluation.  She is been instructed to continue Toradol/Tylenol for her pain and discomfort.  She is been given very strict return precautions to the ED.  She is to have PCP follow up in the next 1-2 days or return to the ED for worsening.  She verbalized understanding and was agreeable to the treatment plan.    Amount and/or Complexity of Data Reviewed  Labs: ordered.  Radiology: ordered.    Risk  Prescription drug management.                               Clinical Impression:   Final diagnoses:  [R10.13] Epigastric pain (Primary)  [R19.06] Epigastric swelling or mass or lump        ED Disposition Condition    Discharge Stable          ED Prescriptions    None       Follow-up Information       Follow up With Specialties Details Why Contact Info    Jl Morales MD Family Medicine In 2 days  [address] [place] [address]  [phone]      [facility] - Emergency Dept Emergency Medicine  If symptoms worsen [address] [facility] [address] [phone]             Vega Alonso PA-C  09/24/23 [id number]

## 2023-09-24 NOTE — ED NOTES
PT sent to the ED by UC with palpable mass per PT. PT has C/O pain and bloating to mid epigastric region x 2 weeks. Pain radiates to right side. C/O intermittent Nausea with no vomiting. Denies blood in stool. LBM-today. Denies any urinary symptoms. Afebrile. VSS. AAOx4.

## 2024-01-15 ENCOUNTER — HOSPITAL ENCOUNTER (EMERGENCY)
Facility: HOSPITAL | Age: 39
Discharge: HOME | End: 2024-01-15
Attending: EMERGENCY MEDICINE
Payer: MEDICAID

## 2024-01-15 VITALS
OXYGEN SATURATION: 99 % | DIASTOLIC BLOOD PRESSURE: 78 MMHG | HEART RATE: 75 BPM | TEMPERATURE: 99 F | RESPIRATION RATE: 20 BRPM | WEIGHT: 218 LBS | HEIGHT: 64 IN | SYSTOLIC BLOOD PRESSURE: 137 MMHG | BODY MASS INDEX: 37.22 KG/M2

## 2024-01-15 DIAGNOSIS — Z48.89 ENCOUNTER FOR POST SURGICAL WOUND CHECK: Primary | ICD-10-CM

## 2024-01-15 PROCEDURE — 25000003 PHARM REV CODE 250: Mod: ER | Performed by: EMERGENCY MEDICINE

## 2024-01-15 PROCEDURE — 99283 EMERGENCY DEPT VISIT LOW MDM: CPT | Mod: ER

## 2024-01-15 RX ORDER — CEPHALEXIN 500 MG/1
500 CAPSULE ORAL
Status: COMPLETED | OUTPATIENT
Start: 2024-01-15 | End: 2024-01-15

## 2024-01-15 RX ORDER — CEPHALEXIN 500 MG/1
500 CAPSULE ORAL 4 TIMES DAILY
Qty: 20 CAPSULE | Refills: 0 | Status: SHIPPED | OUTPATIENT
Start: 2024-01-15 | End: 2024-01-20

## 2024-01-15 RX ADMIN — CEPHALEXIN 500 MG: 500 CAPSULE ORAL at 07:01

## 2024-01-16 NOTE — DISCHARGE INSTRUCTIONS

## 2024-01-16 NOTE — ED NOTES
Review of patient's allergies indicates:  No Known Allergies     Patient has verified the spelling of the name and  on armband.   APPEARANCE: Patient is alert, calm, oriented x 4, and does not appear distressed.  SKIN: Skin is normal for race, warm, and dry. Normal skin turgor and mucous membranes moist. Has some redness around incision site to left breast.   CARDIAC: Normal rate and rhythm, no murmur heard.   RESPIRATORY:Normal rate and effort. Breath sounds clear bilaterally throughout chest. Respirations are equal and unlabored.    GASTRO: Bowel sounds normal, abdomen is soft, no tenderness, and no abdominal distention.  MUSCLE: Full ROM. No bony tenderness or soft tissue tenderness. No obvious deformity.  PERIPHERAL VASCULAR: peripheral pulses present. Normal cap refill. No edema. Warm to touch.  NEURO: Art coma scale: eyes open spontaneously-4, oriented & converses-5, obeys commands-6. No neurological abnormalities.   MENTAL STATUS: awake, alert and aware of environment.  EYE: No overt deficits noted. No drainage. Sclera WNL  ENT: EARS: no obvious drainage. NOSE: no active bleeding. THROAT: no redness or swelling.  BREAST: symmetrical. No masses. Has a surgical incision to anterior left breast from a mass being removed, and is bonded with liquid Band-Aid. Internal stiches reported. Area is tender to touch.   : Voids without complication per patient

## 2024-01-16 NOTE — ED PROVIDER NOTES
Encounter Date: 1/15/2024       History     Chief Complaint   Patient presents with    Post-op Problem     Reports to ED c c/o post op problem to L breast. Pt states had a lump removed; however, redness and swelling began this AM. Had procedure done at [place] on Friday. Denies fever     Sarah Yee is a 38 y.o. female who  has no past medical history on file.    The patient presents to the ED due to redness around the incision site.  Patient states that on  she underwent a lumpectomy at [facility].  She then noticed today after waking up shortly prior to arrival that there was some redness around her incision site.  She states that she had a lump that was removed.  She denies any associated fever chest pain abdominal pain nausea vomiting diarrhea or other concerns.  She has not contacted her surgeon yet for this.    The history is provided by the patient.     Review of patient's allergies indicates:  No Known Allergies  History reviewed. No pertinent past medical history.  Past Surgical History:   Procedure Laterality Date     SECTION      CHOLECYSTECTOMY      EYE MUSCLE SURGERY      EYE SURGERY      LITHOTRIPSY      TUBAL LIGATION      Wrist sx      Left hand     Family History   Problem Relation Age of Onset    Hypertension Mother      Social History     Tobacco Use    Smoking status: Every Day     Current packs/day: 0.50     Types: Cigarettes    Smokeless tobacco: Never   Substance Use Topics    Alcohol use: Yes     Comment: occasionally     Drug use: No     Review of Systems   Constitutional:  Negative for chills and fever.   HENT:  Negative for sore throat.    Respiratory:  Negative for cough and shortness of breath.    Cardiovascular:  Negative for chest pain.   Gastrointestinal:  Negative for nausea and vomiting.   Genitourinary:  Negative for dysuria, frequency and urgency.   Musculoskeletal:  Negative for back pain, neck pain and neck stiffness.   Skin:  Positive for color change and  wound. Negative for rash.   Neurological:  Negative for syncope and weakness.   Hematological:  Does not bruise/bleed easily.   Psychiatric/Behavioral:  Negative for agitation, behavioral problems and confusion.        Physical Exam     Initial Vitals [01/15/24 1911]   BP Pulse Resp Temp SpO2   138/75 78 19 98.7 °F (37.1 °C) 99 %      MAP       --         Physical Exam    Constitutional: No distress.   HENT:   Head: Normocephalic and atraumatic.   Eyes: Conjunctivae are normal.   Cardiovascular:  Intact distal pulses.           Pulmonary/Chest: No respiratory distress.     Neurological: She is alert.   Skin: Skin is warm and dry.   Breast exam with ABILIO Castañeda as chaperone:  Incision appears clean dry and intact.  There is a surrounding blanchable light erythema that is approximately 4 x 4 cm.  There was no warmth.  There is no fluctuance.  There is no induration.   Psychiatric: She has a normal mood and affect.         ED Course   Procedures  Labs Reviewed - No data to display       Imaging Results    None          Medications   cephALEXin capsule 500 mg (500 mg Oral Given 1/15/24 1939)     Medical Decision Making  Differential Diagnosis includes, but is not limited to:  Cellulitis, abscess, necrotizing fasciitis, osteomyelitis, septic joint, MRSA, DVT, drug eruption, allergic/contact dermatitis, EM/SJS, viral exanthem, local trauma/contusion      Risk  Prescription drug management.  Decision regarding hospitalization.  Risk Details: Patient presents today following a lumpectomy with some skin changes which were noticed today.  Vital signs are stable.  No signs or symptoms to suggest a systemic infection.  Patient with dermatitis versus mild cellulitis.  Will plan to treat with Keflex until she can follow-up with her surgeon.  Encouraged her to follow-up with them as soon as possible.  Discussed return precautions for worsening symptoms or any other concerns.    After taking into careful account the historical  factors and physical exam findings of the patient's presentation today, in conjunction with the empirical and objective data obtained on ED workup, no acute emergent medical condition has been identified. The patient appears to be low risk for an emergent medical condition and I feel it is safe and appropriate at this time for the patient to be discharged to follow-up as detailed in their discharge instructions for reevaluation and possible continued outpatient workup and management. I have discussed the specifics of the workup with the patient and the patient has verbalized understanding of the details of the workup, the diagnosis, the treatment plan, and the need for outpatient follow-up.  Although the patient has no emergent etiology today this does not preclude the development of an emergent condition so in addition, I have advised the patient that they can return to the ED and/or activate EMS at any time with worsening of their symptoms, change of their symptoms, or with any other medical complaint.  The patient remained comfortable and stable during their visit in the ED.  Discharge and follow-up instructions discussed with the patient who expressed understanding and willingness to comply with my recommendations.                                        Clinical Impression:  Final diagnoses:  [Z48.89] Encounter for post surgical wound check (Primary)          ED Disposition Condition    Discharge Stable          ED Prescriptions       Medication Sig Dispense Start Date End Date Auth. Provider    cephALEXin (KEFLEX) 500 MG capsule Take 1 capsule (500 mg total) by mouth 4 (four) times daily. for 5 days 20 capsule 1/15/2024 1/20/2024 Clayton Metcalf Jr., MD          Follow-up Information       Follow up With Specialties Details Why Contact Info    Jl Morales MD Family Medicine Schedule an appointment as soon as possible for a visit   [facility] [address] [place] [address]  [phone]          Please  follow-up with your general surgeon/PCP in 2-3 days for wound check          Portions of this note were dictated using voice recognition software and may contain dictation related errors in spelling/grammar/syntax not found on text review       Clayton Metcalf Jr., MD  01/15/24 1950

## 2025-01-09 ENCOUNTER — HOSPITAL ENCOUNTER (OUTPATIENT)
Dept: RADIOLOGY | Facility: HOSPITAL | Age: 40
Discharge: HOME | End: 2025-01-09
Attending: NURSE PRACTITIONER
Payer: MEDICAID

## 2025-01-09 DIAGNOSIS — R05.9 COUGH: ICD-10-CM

## 2025-01-09 DIAGNOSIS — R05.9 COUGH: Primary | ICD-10-CM

## 2025-01-09 PROCEDURE — 71046 X-RAY EXAM CHEST 2 VIEWS: CPT | Mod: TC,FY,PN

## 2025-01-09 PROCEDURE — 71046 X-RAY EXAM CHEST 2 VIEWS: CPT | Mod: 26,,, | Performed by: RADIOLOGY
